# Patient Record
Sex: MALE | Race: WHITE | NOT HISPANIC OR LATINO | Employment: OTHER | URBAN - METROPOLITAN AREA
[De-identification: names, ages, dates, MRNs, and addresses within clinical notes are randomized per-mention and may not be internally consistent; named-entity substitution may affect disease eponyms.]

---

## 2019-01-08 ENCOUNTER — OFFICE VISIT - RIVER FALLS (OUTPATIENT)
Dept: FAMILY MEDICINE | Facility: CLINIC | Age: 64
End: 2019-01-08

## 2019-01-08 ASSESSMENT — MIFFLIN-ST. JEOR: SCORE: 1876.69

## 2019-01-09 LAB
CHOLEST SERPL-MCNC: 279 MG/DL
CHOLEST/HDLC SERPL: 4.4 {RATIO}
GLUCOSE BLD-MCNC: 100 MG/DL (ref 65–99)
HDLC SERPL-MCNC: 63 MG/DL
LDLC SERPL CALC-MCNC: 195 MG/DL
NONHDLC SERPL-MCNC: 216 MG/DL
PSA SERPL-MCNC: 2.7 NG/ML
TRIGL SERPL-MCNC: 95 MG/DL

## 2019-01-14 ENCOUNTER — OFFICE VISIT - RIVER FALLS (OUTPATIENT)
Dept: FAMILY MEDICINE | Facility: CLINIC | Age: 64
End: 2019-01-14

## 2019-01-14 ASSESSMENT — MIFFLIN-ST. JEOR: SCORE: 1882.13

## 2019-04-15 ENCOUNTER — OFFICE VISIT - RIVER FALLS (OUTPATIENT)
Dept: FAMILY MEDICINE | Facility: CLINIC | Age: 64
End: 2019-04-15

## 2019-04-15 ASSESSMENT — MIFFLIN-ST. JEOR: SCORE: 1846.75

## 2020-01-28 ENCOUNTER — OFFICE VISIT - RIVER FALLS (OUTPATIENT)
Dept: FAMILY MEDICINE | Facility: CLINIC | Age: 65
End: 2020-01-28

## 2020-01-28 ENCOUNTER — COMMUNICATION - RIVER FALLS (OUTPATIENT)
Dept: FAMILY MEDICINE | Facility: CLINIC | Age: 65
End: 2020-01-28

## 2020-01-28 ASSESSMENT — MIFFLIN-ST. JEOR: SCORE: 1885.76

## 2020-01-29 ENCOUNTER — COMMUNICATION - RIVER FALLS (OUTPATIENT)
Dept: FAMILY MEDICINE | Facility: CLINIC | Age: 65
End: 2020-01-29

## 2021-01-26 ENCOUNTER — AMBULATORY - RIVER FALLS (OUTPATIENT)
Dept: FAMILY MEDICINE | Facility: CLINIC | Age: 66
End: 2021-01-26

## 2021-01-27 ENCOUNTER — COMMUNICATION - RIVER FALLS (OUTPATIENT)
Dept: FAMILY MEDICINE | Facility: CLINIC | Age: 66
End: 2021-01-27

## 2021-01-27 LAB
BUN SERPL-MCNC: 19 MG/DL (ref 7–25)
BUN/CREAT RATIO - HISTORICAL: NORMAL (ref 6–22)
CALCIUM SERPL-MCNC: 9.2 MG/DL (ref 8.6–10.3)
CHLORIDE BLD-SCNC: 107 MMOL/L (ref 98–110)
CHOLEST SERPL-MCNC: 178 MG/DL
CHOLEST/HDLC SERPL: 2.7 {RATIO}
CO2 SERPL-SCNC: 29 MMOL/L (ref 20–32)
CREAT SERPL-MCNC: 1.11 MG/DL (ref 0.7–1.25)
EGFRCR SERPLBLD CKD-EPI 2021: 69 ML/MIN/1.73M2
GLUCOSE BLD-MCNC: 97 MG/DL (ref 65–99)
HDLC SERPL-MCNC: 67 MG/DL
LDLC SERPL CALC-MCNC: 95 MG/DL
NONHDLC SERPL-MCNC: 111 MG/DL
POTASSIUM BLD-SCNC: 4.5 MMOL/L (ref 3.5–5.3)
SODIUM SERPL-SCNC: 141 MMOL/L (ref 135–146)
TRIGL SERPL-MCNC: 73 MG/DL

## 2021-02-01 ENCOUNTER — TRANSFERRED RECORDS (OUTPATIENT)
Dept: MULTI SPECIALTY CLINIC | Facility: CLINIC | Age: 66
End: 2021-02-01
Payer: MEDICARE

## 2021-02-01 ENCOUNTER — OFFICE VISIT - RIVER FALLS (OUTPATIENT)
Dept: FAMILY MEDICINE | Facility: CLINIC | Age: 66
End: 2021-02-01

## 2021-02-01 ASSESSMENT — MIFFLIN-ST. JEOR: SCORE: 1844.94

## 2021-02-12 ENCOUNTER — COMMUNICATION - RIVER FALLS (OUTPATIENT)
Dept: FAMILY MEDICINE | Facility: CLINIC | Age: 66
End: 2021-02-12

## 2021-02-16 ENCOUNTER — OFFICE VISIT - RIVER FALLS (OUTPATIENT)
Dept: FAMILY MEDICINE | Facility: CLINIC | Age: 66
End: 2021-02-16

## 2021-02-16 ASSESSMENT — MIFFLIN-ST. JEOR: SCORE: 1849.47

## 2021-07-27 ENCOUNTER — AMBULATORY - RIVER FALLS (OUTPATIENT)
Dept: FAMILY MEDICINE | Facility: CLINIC | Age: 66
End: 2021-07-27

## 2021-08-24 ENCOUNTER — AMBULATORY - RIVER FALLS (OUTPATIENT)
Dept: FAMILY MEDICINE | Facility: CLINIC | Age: 66
End: 2021-08-24

## 2021-11-18 ENCOUNTER — OFFICE VISIT - RIVER FALLS (OUTPATIENT)
Dept: FAMILY MEDICINE | Facility: CLINIC | Age: 66
End: 2021-11-18

## 2021-11-18 ASSESSMENT — MIFFLIN-ST. JEOR: SCORE: 1881.23

## 2022-02-12 VITALS
WEIGHT: 233 LBS | HEART RATE: 80 BPM | DIASTOLIC BLOOD PRESSURE: 74 MMHG | BODY MASS INDEX: 30.88 KG/M2 | SYSTOLIC BLOOD PRESSURE: 124 MMHG | TEMPERATURE: 97.5 F | HEIGHT: 73 IN

## 2022-02-12 VITALS
BODY MASS INDEX: 29.74 KG/M2 | DIASTOLIC BLOOD PRESSURE: 80 MMHG | HEART RATE: 84 BPM | HEIGHT: 73 IN | TEMPERATURE: 97.4 F | WEIGHT: 224.4 LBS | SYSTOLIC BLOOD PRESSURE: 124 MMHG

## 2022-02-12 VITALS
HEART RATE: 80 BPM | TEMPERATURE: 97.9 F | DIASTOLIC BLOOD PRESSURE: 78 MMHG | BODY MASS INDEX: 30.75 KG/M2 | SYSTOLIC BLOOD PRESSURE: 158 MMHG | WEIGHT: 232 LBS | HEIGHT: 73 IN | RESPIRATION RATE: 16 BRPM

## 2022-02-12 VITALS
WEIGHT: 225 LBS | BODY MASS INDEX: 29.82 KG/M2 | HEIGHT: 73 IN | DIASTOLIC BLOOD PRESSURE: 64 MMHG | HEART RATE: 62 BPM | SYSTOLIC BLOOD PRESSURE: 126 MMHG

## 2022-02-12 VITALS
TEMPERATURE: 97.6 F | DIASTOLIC BLOOD PRESSURE: 74 MMHG | BODY MASS INDEX: 30.62 KG/M2 | WEIGHT: 231 LBS | HEART RATE: 74 BPM | HEART RATE: 76 BPM | HEIGHT: 73 IN | DIASTOLIC BLOOD PRESSURE: 84 MMHG | BODY MASS INDEX: 30.77 KG/M2 | HEIGHT: 73 IN | SYSTOLIC BLOOD PRESSURE: 136 MMHG | TEMPERATURE: 97.8 F | WEIGHT: 232.2 LBS | SYSTOLIC BLOOD PRESSURE: 130 MMHG

## 2022-02-12 VITALS
WEIGHT: 224 LBS | DIASTOLIC BLOOD PRESSURE: 78 MMHG | SYSTOLIC BLOOD PRESSURE: 132 MMHG | BODY MASS INDEX: 29.69 KG/M2 | HEIGHT: 73 IN | HEART RATE: 82 BPM

## 2022-02-15 ENCOUNTER — AMBULATORY - RIVER FALLS (OUTPATIENT)
Dept: FAMILY MEDICINE | Facility: CLINIC | Age: 67
End: 2022-02-15

## 2022-02-16 NOTE — RESULTS
Patient:   CHASE ZAMORA            MRN: [id number]            FIN: [id number]               Age:   63 years     Sex:  Male     :  1955   Associated Diagnoses:   None   Author:   Krista GONZALEZ, Shalom      Procedure   EKG procedure   Date:  2019.     Indication: pre-operative exam.     EKG findings   Rhythm: heart rate  71  beats/min, sinus normal.     Axis: normal axis, normal configuration.     Intervals: normal.     P waves: normal.     QRS complex: normal.     ST-T-U complex: normal.     Interpretation: normal EKG.

## 2022-02-16 NOTE — NURSING NOTE
Quick Intake Entered On:  11/18/2021 2:29 PM CST    Performed On:  11/18/2021 2:28 PM CST by Mann Queen CMA               Summary   Advance Directive :   No   Height Measured :   73 in(Converted to: 6 ft 1 in, 185.42 cm)    Systolic Blood Pressure :   158 mmHg (HI)    Diastolic Blood Pressure :   78 mmHg   Mean Arterial Pressure :   105 mmHg   BP Site :   Right arm   BP Method :   Manual   Mann Queen CMA - 11/18/2021 2:28 PM CST

## 2022-02-16 NOTE — NURSING NOTE
Comprehensive Intake Entered On:  11/18/2021 2:07 PM CST    Performed On:  11/18/2021 2:01 PM CST by Mann Queen CMA               Summary   Chief Complaint :   Pt here for Right jaw pain and edema x 2 days   Advance Directive :   No   Weight Measured :   232 lb(Converted to: 232 lb 0 oz, 105.233 kg)    Height Measured :   73 in(Converted to: 6 ft 1 in, 185.42 cm)    Body Mass Index :   30.61 kg/m2 (HI)    Body Surface Area :   2.33 m2   Systolic Blood Pressure :   186 mmHg (HI)    Diastolic Blood Pressure :   72 mmHg   Mean Arterial Pressure :   110 mmHg   Peripheral Pulse Rate :   80 bpm   BP Site :   Right arm   Pulse Site :   Radial artery   Temperature Tympanic :   97.9 DegF(Converted to: 36.6 DegC)    Respiratory Rate :   16 br/min   Mann Queen CMA - 11/18/2021 2:01 PM CST   Health Status   Allergies Verified? :   Yes   Medication History Verified? :   Yes   Medical History Verified? :   Yes   Pre-Visit Planning Status :   Not completed   Tobacco Use? :   Current some day smoker   Mann Queen CMA - 11/18/2021 2:01 PM CST   Meds / Allergies   (As Of: 11/18/2021 2:07:40 PM CST)   Allergies (Active)   penicillin  Estimated Onset Date:   Unspecified ; Reactions:   rash ; Created By:   Sade Mendez; Reaction Status:   Active ; Category:   Drug ; Substance:   penicillin ; Type:   Allergy ; Updated By:   Sade Mendez; Reviewed Date:   11/18/2021 2:05 PM CST        Medication List   (As Of: 11/18/2021 2:07:40 PM CST)   Prescription/Discharge Order    Miscellaneous Rx Supply  :   Miscellaneous Rx Supply ; Status:   Prescribed ; Ordered As Mnemonic:   CPAP Supplies ; Simple Display Line:   See Instructions, Heated humidifier x1; Humidifier chamber x1;  Heated tubing x1; Full face mask of choice with headgear  x1; Cushion x 1;\r\nFilters: Disposable x1pk & Reusable x1pk.\r\nLength of Need = 99 Months, 1 EA, 11 Refill(s) ; Ordering Provider:   Joselo Hernandez MD; Catalog Code:   Miscellaneous Rx Supply ;  Order Dt/Tm:   2/16/2021 8:45:37 AM CST          triamcinolone topical  :   triamcinolone topical ; Status:   Prescribed ; Ordered As Mnemonic:   triamcinolone 0.1% topical cream ; Simple Display Line:   1 santa, Topical, bid, 60 gm, 2 Refill(s) ; Ordering Provider:   Shalom Velasco MD; Catalog Code:   triamcinolone topical ; Order Dt/Tm:   2/1/2021 9:03:29 AM CST          atorvastatin  :   atorvastatin ; Status:   Prescribed ; Ordered As Mnemonic:   atorvastatin 20 mg oral tablet ; Simple Display Line:   1 tab(s), Oral, daily, 90 tab(s), 3 Refill(s) ; Ordering Provider:   Shalom Velasco MD; Catalog Code:   atorvastatin ; Order Dt/Tm:   2/1/2021 8:50:21 AM CST            Home Meds    Miscellaneous Rx Supply  :   Miscellaneous Rx Supply ; Status:   Completed ; Ordered As Mnemonic:   Cpap ; Simple Display Line:   0 Refill(s) ; Catalog Code:   Miscellaneous Rx Supply ; Order Dt/Tm:   1/8/2019 8:03:55 AM CST          aspirin  :   aspirin ; Status:   Documented ; Ordered As Mnemonic:   aspirin 81 mg oral tablet ; Simple Display Line:   81 mg, 1 tab(s), PO, daily, tab(s) ; Catalog Code:   aspirin ; Order Dt/Tm:   2/22/2012 2:57:02 PM CST            Social History   Social History   (As Of: 11/18/2021 2:07:40 PM CST)   Alcohol:        Current, 7 TIMES PER WEEK   (Last Updated: 1/9/2019 2:43:06 PM CST by Virgen Anderson)          Tobacco:        Past   Comments:  2/21/2012 9:44 AM - Sade Mendez: occasional cigar   (Last Updated: 12/16/2014 2:50:07 PM CST by Juliette Monterroso MA)   Started at age 21, stopped age 43., Snuff   (Last Updated: 2/1/2021 8:21:21 AM CST by Lala Tuttle LPN)          Electronic Cigarette/Vaping:        Electronic Cigarette Use: Never.   (Last Updated: 2/1/2021 8:21:07 AM CST by Lala Tuttle LPN)          Substance Abuse:        Never   (Last Updated: 12/16/2014 2:50:12 PM CST by Juliette Monterroso MA)          Employment/School:        Employed, Work/School description: 3M  .   (Last Updated: 3/26/2013 7:30:35 AM CDT by Sandra Benavidez)          Home/Environment:        Marital status: .  Spouse/Partner name: Landy.   (Last Updated: 3/26/2013 7:30:44 AM CDT by Sandra Benavidez)          Nutrition/Health:        Type of diet: Regular.   (Last Updated: 1/9/2019 8:23:49 AM CST by Elif Harrison)          Exercise:        Exercise frequency: 3-4 times/week.   (Last Updated: 1/28/2020 9:09:55 AM CST by Juliette Monterroso MA)          Sexual:        Sexually active: Yes.  Identifies as male, Sexual orientation: Straight or heterosexual.  History of STD: No.  Contraceptive Use Details: None.  History of sexual abuse: No.   (Last Updated: 2/2/2021 1:18:01 PM CST by Shaneka Ayon)

## 2022-02-16 NOTE — NURSING NOTE
CAGE Assessment Entered On:  2/1/2021 8:27 AM CST    Performed On:  2/1/2021 8:26 AM CST by Lala Tuttle LPN               Assessment   Have you ever felt you should cut down on your drinking :   No   Have people annoyed you by criticizing your drinking :   No   Have you ever felt bad or guilty about your drinking :   No   Have you ever taken a drink first thing in the morning to steady your nerves or get rid of a hangover (Eye-opener) :   No   CAGE Score :   0    Lala Tuttle LPN - 2/1/2021 8:26 AM CST

## 2022-02-16 NOTE — TELEPHONE ENCOUNTER
---------------------  From: Shalom Velasco MD   To: CHASE ZAMORA    Sent: 1/29/2020 1:15:46 PM CST  Subject: Patient Message - Results Notification     Your tests look good.       Results:  Date Result Name Ind Value Ref Range   1/28/2020 10:18 AM Glucose Level ((H)) 103 mg/dL (65 - 99)   1/28/2020 10:18 AM Hgb A1c  5.4 ( - <5.7)   1/28/2020 10:18 AM Cholesterol  190 mg/dL ( - <200)   1/28/2020 10:18 AM Non-HDL Cholesterol  123 ( - <130)   1/28/2020 10:18 AM HDL  67 mg/dL (>40 - )   1/28/2020 10:18 AM Cholesterol/HDL Ratio  2.8 ( - <5.0)   1/28/2020 10:18 AM LDL ((H)) 104    1/28/2020 10:18 AM Triglyceride  94 mg/dL ( - <150)

## 2022-02-16 NOTE — NURSING NOTE
Depression Screening Entered On:  1/9/2019 2:42 PM CST    Performed On:  1/8/2019 2:42 PM CST by Virgen Anderson               Depression Screening   Feeling Down, Depressed, Hopeless :   Not at all   Little Interest - Pleasure in Activities :   Not at all   Initial Depression Screen Score :   0    Trouble Falling or Staying Asleep :   Not at all   Feeling Tired or Little Energy :   Not at all   Poor Appetite or Overeating :   Not at all   Feeling Bad About Yourself :   Not at all   Trouble Concentrating :   Not at all   Moving or Speaking Slowly :   Not at all   Thoughts Better Off Dead or Hurting Self :   Not at all   Detailed Depression Screen Score :   0    Total Depression Screen Score :   0    SONIA Difficulty with Work, Home, Others :   Not difficult at all   Virgen Anderson - 1/9/2019 2:42 PM CST

## 2022-02-16 NOTE — TELEPHONE ENCOUNTER
---------------------  From: Shalom Velasco MD   To: CHASE ZAMORA    Sent: 1/9/2019 2:59:01 PM CST  Subject: Patient Message - Results Notification      Your LDL or bad cholesterol is elevated and I recommend starting a cholesterol lowering medication to reduce your risk of heart disease.  Please let me know if you would like to start this and your desired pharmacy.    Results:  Date Result Name Ind Value Ref Range   1/8/2019 9:04 AM Glucose Level ((H)) 100 mg/dL (65 - 99)   1/8/2019 9:04 AM Cholesterol ((H)) 279 mg/dL ( - <200)   1/8/2019 9:04 AM Non-HDL Cholesterol ((H)) 216 ( - <130)   1/8/2019 9:04 AM HDL  63 mg/dL (>40 - )   1/8/2019 9:04 AM Cholesterol/HDL Ratio  4.4 ( - <5.0)   1/8/2019 9:04 AM LDL ((H)) 195    1/8/2019 9:04 AM Triglyceride  95 mg/dL ( - <150)   1/8/2019 9:04 AM PSA  2.7 ng/mL ( - < OR = 4.0)

## 2022-02-16 NOTE — PROGRESS NOTES
Chief Complaint    [facility]medicare  History of Present Illness      General health status:  good      Diet:  balanced      Exercise:  occasional      Medical encounters:  none      Dental exam:  due next week      Eye exam:  due      Caffeine use:  daily      Tobacco use:  no      Alcohol use:  daily      Lipid and diabetes screening:  up to date      Colon cancer screening:  due 2022      Prostate cancer screening:  up to date      Other concerns:  none      Chronic disease:  hyperlipidemia and HTN under control         Review of Systems          Constitutional:  No fever, No chills, No sweats, No weakness, No fatigue.            Eye:  No recent visual problem.            Ear/Nose/Mouth/Throat:  No decreased hearing, No nasal congestion, No sore throat.            Respiratory:  No shortness of breath, No cough.            Cardiovascular:  Negative, No chest pain, No palpitations, No peripheral edema.            Gastrointestinal:  No nausea, No vomiting, No diarrhea, No constipation, No heartburn.            Genitourinary:  No dysuria, No change in urine stream.            Hematology/Lymphatics:  No bruising tendency, No bleeding tendency.            Endocrine:  No cold intolerance, No heat intolerance.            Immunologic:  Negative.            Musculoskeletal:  No back pain, No neck pain, No joint pain, No muscle pain.            Integumentary: rash, No dryness, No skin lesion.            Neurologic:  Alert and oriented X4, No headache.                Psychiatric:  No anxiety, No depression  Physical Exam   Vitals & Measurements    HR: 82 (Peripheral)  BP: 132/78     HT: 73 in  WT: 224 lb  BMI: 29.55           General:  Alert and oriented, No acute distress.            Eye:  Pupils are equal, round and reactive to light, Extraocular movements are intact, Normal conjunctiva.            HENT:  Normocephalic, Tympanic membranes are clear, Oral mucosa is moist, No pharyngeal erythema, No sinus tenderness.             Neck:  Supple, Non-tender, No carotid bruit, No lymphadenopathy, No thyromegaly.            Respiratory:  Lungs are clear to auscultation, Respirations are non-labored, Breath sounds are equal, No chest wall tenderness.            Cardiovascular:  Normal rate, Regular rhythm, No murmur, No gallop, Good pulses equal in all extremities, Normal peripheral perfusion, No edema.            Gastrointestinal:  Soft, Non-tender, Non-distended, Normal bowel sounds, No organomegaly.            Genitourinary:  No CVA tenderness          Lymphatics:  WNL.            Musculoskeletal:  Normal range of motion, Normal strength, No tenderness, No swelling, No deformity.            Integumentary:  Warm, Dry, erythematous, scaling, lesions on trunk          Neurologic:  Alert, Oriented, Normal sensory, Normal motor function, No focal deficits.            Psychiatric:  Cooperative, Appropriate mood & affect.   Assessment/Plan       1. Medicare annual wellness visit, subsequent (Z00.00)         Discussed diet, weight management, BMI, activity and exercise        Follow up in one year        Activity recommendations:  150-300 minutes of moderate physical activity per week; moderate or greater intensity muscle strengthening activity 2 or more days a week        Diet recommendations:  Eating plan to promote a caloric deficit of 500-750 kcal per day.  Mediterranean or DASH diet offer well balanced food choices.        Risk factors for development of chronic disease and infirmities of ageing have been discussed and plans for minimizing and screening for these conditions have been discussed.  Plans in place for management of conditions identified.  The preventive services checklist has been reviewed with the patient and a copy has been placed in the electronic record.  Prostate cancer, colon cancer, diabetes, lipid, HTN, obesity screening discussed and initiated                2. Hypertension goal BP (blood pressure) < 130/80 (I10)          controlled with TLC                3. Hyperlipidemia (E78.2)         controlled, tolerates statin                4. DERECK (obstructive sleep apnea) (G47.33)         compliant with CPAP                5. 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)         lesions on trunk are psoriatic in nature         Ordered:          triamcinolone topical, 1 santa, Topical, bid, # 60 gm, 2 Refill(s), Type: Maintenance, Pharmacy: [facility], 1 santa Topical bid, 73, in, 02/01/21 8:19:00 CST, Height Measured, 224, lb, 02/01/21 8:19:00 CST, Weight Measured, (Ordered)                Orders:         atorvastatin, = 1 tab(s), Oral, daily, # 30 tab(s), 0 Refill(s), Type: Hard Stop, Pharmacy: [facility], Pt is due for annual visit and labs prior to further refills., 73, in, 01/28/20 9:00:00 CST, Height Measured, 233, lb, 01/28/20 9:00:00 CST, Weight Ayla..., (Completed)         atorvastatin, = 1 tab(s), Oral, daily, # 90 tab(s), 3 Refill(s), Type: Maintenance, Pharmacy: [facility], 1 tab(s) Oral daily, 73, in, 02/01/21 8:19:00 CST, Height Measured, 224, lb, 02/01/21 8:19:00 CST, Weight Measured, (Ordered)         pneumococcal 23-polyvalent vaccine, 0.5 mL, IM, once, (Completed)         97737 imadm prq id subq/im njxs 1 vaccine (Charge), Quantity: 1, Encounter for vaccination         55189 pneumococcal polysac vaccine 23-v 2 />yr subq/im (Charge), Quantity: 1, Encounter for vaccination         Return to Clinic (Request), RFV: Yearly exam/AWV, Return in 1 year         Return to Clinic (Request), RFV: AWV, fasting labs, Return in 1 year  Patient Information     Name:CHASE ZAMORA CATRACHITO      Address:      [address] [place] [address] [address] [place] [place], [place] [id number]     Sex:Male     YOB: 1955     [phone]     Emergency Contact:LUCAS ZAMORA     MRN:[id number]     FIN:[id number]     Location:[facility]     Date of Service:02/01/2021      Primary Care Physician:       Krista GONZALEZ, Shalom, [phone]       Attending Physician:       Shalom Velasco MD, [phone]  Problem List/Past Medical History    Ongoing     Hypertension goal BP (blood pressure) < 130/80     Obesity     DERECK (obstructive sleep apnea)     Tobacco use    Historical     Cyst, arachnoid  Procedure/Surgical History     Bilateral inguinal hernia repair (01/22/2019)           Polysomnogram - Split Night Study (01/27/2015)            Comments: AHI 22.1/hr and heany snoring 80% of time.  CPAP titrated to +8cm H20 which decreased AHI to 0.9/hr and eliminated snoring..     Colonoscopy (03/19/2012)            Comments: Normal colonoscopy repeat in 10 years.     Bilateral vasectomy (02/23/1995)        Medications    aspirin 81 mg oral tablet, 81 mg= 1 tab(s), Oral, daily    atorvastatin 20 mg oral tablet, 1 tab(s), Oral, daily, 3 refills    Cpap    triamcinolone 0.1% topical cream, 1 santa, Topical, bid, 2 refills  Allergies    penicillin (rash)  Social History    Smoking Status     Former smoker     Alcohol      Current, 7 TIMES PER WEEK     Electronic Cigarette/Vaping      Electronic Cigarette Use: Never.     Employment/School      Employed, Work/School description: 3M .     Exercise      Exercise frequency: 3-4 times/week.     Home/Environment      Marital status: . Spouse/Partner name: Landy.     Nutrition/Health      Type of diet: Regular.     Sexual      Sexually active: No. Identifies as male, Sexual orientation: Straight or heterosexual.     Substance Abuse      Never     Tobacco      Started at age 21, stopped age 43., Snuff  Family History    Alzheimer's disease: Mother.    HTN - Hypertension: Father.    Rheumatoid arthritis: Father.  Immunizations      Vaccine Date Status          pneumococcal (PPSV23) 02/01/2021 Given          zoster vaccine, inactivated 10/21/2020 Recorded          pneumococcal (PCV13) 01/28/2020 Given          tetanus/diphth/pertuss (Tdap) adult/adol 03/25/2013 Given          Td 05/03/2004 Recorded  Lab Results        Lab Results (Last 4 results within 90 days)        Sodium Level: 141 mmol/L [135 mmol/L - 146 mmol/L] (01/26/21 07:59:00)       Potassium Level: 4.5 mmol/L [3.5 mmol/L - 5.3 mmol/L] (01/26/21 07:59:00)       Chloride Level: 107 mmol/L [98 mmol/L - 110 mmol/L] (01/26/21 07:59:00)       CO2 Level: 29 mmol/L [20 mmol/L - 32 mmol/L] (01/26/21 07:59:00)       Glucose Level: 97 mg/dL [65 mg/dL - 99 mg/dL] (01/26/21 07:59:00)       BUN: 19 mg/dL [7 mg/dL - 25 mg/dL] (01/26/21 07:59:00)       Creatinine Level: 1.11 mg/dL [0.7 mg/dL - 1.25 mg/dL] (01/26/21 07:59:00)       BUN/Creat Ratio: NOT APPLICABLE [6  - 22] (01/26/21 07:59:00)       eGFR: 69 mL/min/1.73m2 (01/26/21 07:59:00)       eGFR : 80 mL/min/1.73m2 (01/26/21 07:59:00)       Calcium Level: 9.2 mg/dL [8.6 mg/dL - 10.3 mg/dL] (01/26/21 07:59:00)       Cholesterol: 178 mg/dL (01/26/21 07:59:00)       Non-HDL Cholesterol: 111 (01/26/21 07:59:00)       HDL: 67 mg/dL (01/26/21 07:59:00)       Cholesterol/HDL Ratio: 2.7 (01/26/21 07:59:00)       LDL: 95 (01/26/21 07:59:00)       Triglyceride: 73 mg/dL (01/26/21 07:59:00)

## 2022-02-16 NOTE — NURSING NOTE
Comprehensive Intake Entered On:  1/8/2019 8:05 AM CST    Performed On:  1/8/2019 8:00 AM CST by Lala Tuttle LPN               Summary   Chief Complaint :   Annual Physical    Weight Measured :   231 lb(Converted to: 231 lb 0 oz, 104.78 kg)    Height Measured :   73 in(Converted to: 6 ft 1 in, 185.42 cm)    Body Mass Index :   30.47 kg/m2 (HI)    Body Surface Area :   2.32 m2   Systolic Blood Pressure :   146 mmHg (HI)    Diastolic Blood Pressure :   88 mmHg (HI)    Mean Arterial Pressure :   107 mmHg   Peripheral Pulse Rate :   74 bpm   BP Site :   Right arm   Pulse Site :   Radial artery   BP Method :   Manual   HR Method :   Manual   Temperature Tympanic :   97.6 DegF(Converted to: 36.4 DegC)  (LOW)    Lala Tuttle LPN - 1/8/2019 8:00 AM CST   Health Status   Allergies Verified? :   Yes   Medication History Verified? :   Yes   Pre-Visit Planning Status :   Completed   Lala Tuttle LPN - 1/8/2019 8:00 AM CST   Consents   Consent for Immunization Exchange :   Consent Granted   Consent for Immunizations to Providers :   Consent Granted   Lala Tuttle LPN - 1/8/2019 8:00 AM CST   Meds / Allergies   (As Of: 1/8/2019 8:05:29 AM CST)   Allergies (Active)   penicillin  Estimated Onset Date:   Unspecified ; Reactions:   rash ; Created By:   Sade Mendez; Reaction Status:   Active ; Category:   Drug ; Substance:   penicillin ; Type:   Allergy ; Updated By:   Sade Mendez; Reviewed Date:   11/11/2015 5:32 PM CST        Medication List   (As Of: 1/8/2019 8:05:29 AM CST)   Home Meds    Miscellaneous Rx Supply  :   Miscellaneous Rx Supply ; Status:   Documented ; Ordered As Mnemonic:   Cpap ; Simple Display Line:   0 Refill(s) ; Catalog Code:   Miscellaneous Rx Supply ; Order Dt/Tm:   1/8/2019 8:03:55 AM          aspirin  :   aspirin ; Status:   Documented ; Ordered As Mnemonic:   aspirin 81 mg oral tablet ; Simple Display Line:   81 mg, 1 tab(s), PO, daily, tab(s) ; Catalog Code:   aspirin ; Order  Dt/Tm:   2/22/2012 2:57:02 PM

## 2022-02-16 NOTE — NURSING NOTE
CAGE Assessment Entered On:  1/9/2019 2:42 PM CST    Performed On:  1/8/2019 2:42 PM CST by Virgen Anderson               Assessment   Have you ever felt you should cut down on your drinking :   Yes   Have people annoyed you by criticizing your drinking :   No   Have you ever felt bad or guilty about your drinking :   No   Have you ever taken a drink first thing in the morning to steady your nerves or get rid of a hangover (Eye-opener) :   No   CAGE Score :   1    Virgen Anedrson - 1/9/2019 2:42 PM CST

## 2022-02-16 NOTE — NURSING NOTE
Comprehensive Intake Entered On:  1/14/2019 7:59 AM CST    Performed On:  1/14/2019 7:54 AM CST by Loc PERSON, Juliette               Summary   Chief Complaint :   preop--surgery 1/22/18 at [facility] w/ Dr. Yu for bilateral IH repair.   Weight Measured :   232.2 lb(Converted to: 232 lb 3 oz, 105.32 kg)    Height Measured :   73 in(Converted to: 6 ft 1 in, 185.42 cm)    Body Mass Index :   30.63 kg/m2 (HI)    Body Surface Area :   2.33 m2   Systolic Blood Pressure :   130 mmHg   Diastolic Blood Pressure :   74 mmHg   Mean Arterial Pressure :   93 mmHg   Peripheral Pulse Rate :   76 bpm   BP Site :   Right arm   Pulse Site :   Radial artery   BP Method :   Manual   HR Method :   Manual   Temperature Tympanic :   97.8 DegF(Converted to: 36.6 DegC)  (LOW)    Juliette Monterroso MA - 1/14/2019 7:54 AM CST   Health Status   Allergies Verified? :   Yes   Medication History Verified? :   Yes   Medical History Verified? :   Yes   Pre-Visit Planning Status :   Completed   Tobacco Use? :   Former smoker   Juliette Monterroso MA - 1/14/2019 7:54 AM CST   Consents   Consent for Immunization Exchange :   Consent Granted   Consent for Immunizations to Providers :   Consent Granted   Juliette Monterroso MA - 1/14/2019 7:54 AM CST   Meds / Allergies   (As Of: 1/14/2019 8:00:00 AM CST)   Allergies (Active)   penicillin  Estimated Onset Date:   Unspecified ; Reactions:   rash ; Created By:   Sade Mendez; Reaction Status:   Active ; Category:   Drug ; Substance:   penicillin ; Type:   Allergy ; Updated By:   Sade Mendez; Reviewed Date:   11/11/2015 5:32 PM CST        Medication List   (As Of: 1/14/2019 8:00:00 AM CST)   Home Meds    aspirin  :   aspirin ; Status:   Documented ; Ordered As Mnemonic:   aspirin 81 mg oral tablet ; Simple Display Line:   81 mg, 1 tab(s), PO, daily, tab(s) ; Catalog Code:   aspirin ; Order Dt/Tm:   2/22/2012 2:57:02 PM          Miscellaneous Rx Supply  :   Miscellaneous Rx Supply ; Status:   Documented ;  Ordered As Mnemonic:   Cpap ; Simple Display Line:   0 Refill(s) ; Catalog Code:   Miscellaneous Rx Supply ; Order Dt/Tm:   1/8/2019 8:03:55 AM            Social History   Social History   (As Of: 1/14/2019 8:00:00 AM CST)   Alcohol:        Current, 7 TIMES PER WEEK   (Last Updated: 1/9/2019 2:43:06 PM CST by Virgen Anderson)          Tobacco:        Past   Comments:  2/21/2012 9:44 AM - Sade Mendez: occasional cigar   (Last Updated: 12/16/2014 2:50:07 PM CST by Juliette Monterroso MA)          Substance Abuse:        Never   (Last Updated: 12/16/2014 2:50:12 PM CST by Juliette Monterroso MA)          Employment and Education:        Employed, Work/School description: 3M .   (Last Updated: 3/26/2013 7:30:35 AM CDT by Sandra Benavidez)          Home and Environment:        Marital status: .  Spouse/Partner name: Landy.   (Last Updated: 3/26/2013 7:30:44 AM CDT by Sandra Benavidez)          Nutrition and Health:        Type of diet: Regular.   (Last Updated: 1/9/2019 8:23:49 AM CST by Elif Harrison)          Exercise and Physical Activity:        Exercise frequency: 2-3 times per week.   (Last Updated: 1/9/2019 8:24:00 AM CST by Elif Harrison)          Sexual:        Sexually active: Yes.  Identifies as male, Sexual orientation: Straight or heterosexual.   (Last Updated: 1/9/2019 8:24:08 AM CST by Elif Harrison)

## 2022-02-16 NOTE — PROGRESS NOTES
Patient:   CHASE ZAMORA            MRN: [id number]            FIN: [id number]               Age:   63 years     Sex:  Male     :  1955   Associated Diagnoses:   Preoperative examination; Bilateral inguinal hernia   Author:   Shalom Velasco MD      Preoperative Information   Indication for surgery:  Bilateral inguinal hernias.         Associated symptoms: discomfort with activity.    Accompanied by:  No one.    Source of history:  Self, Medical record.        History limitation:  None.       Chief Complaint   2019 7:54 AM CST    preop--surgery 17 at [facility] w/ Dr. Yu for bilateral IH repair.      Review of Systems   Constitutional:  No fever, No chills, No sweats, No weakness, No fatigue.    Eye:  No recent visual problem.    Ear/Nose/Mouth/Throat:  No decreased hearing, No nasal congestion, No sore throat.    Respiratory:  No shortness of breath, No cough.    Cardiovascular:  Negative, No chest pain, No palpitations, No peripheral edema.    Gastrointestinal:  No nausea, No vomiting, No diarrhea, No constipation, No heartburn.    Genitourinary:  No dysuria, No change in urine stream.    Hematology/Lymphatics:  No bruising tendency, No bleeding tendency.    Endocrine:  No cold intolerance, No heat intolerance.    Immunologic:  Negative.    Musculoskeletal:  No back pain, No neck pain, No joint pain, No muscle pain.    Integumentary:  No rash, No dryness, No skin lesion.    Neurologic:  Alert and oriented X4, No headache.    Psychiatric:  No anxiety, No depression.       Health Status   Allergies:    Allergic Reactions (Selected)  Severity Not Documented  Penicillin (Rash)   Medications:  (Selected)   Prescriptions  Prescribed  atorvastatin 20 mg oral tablet: = 1 tab(s) ( 20 mg ), PO, Daily, # 90 tab(s), 1 Refill(s), Type: Maintenance, Pharmacy: [id number], 1 tab(s) Oral daily  Documented Medications  Documented  Cpap: Cpap, Supply, 0 Refill(s), Type: Maintenance  aspirin  81 mg oral tablet: 1 tab(s) ( 81 mg ), PO, daily, tab(s), 0 Refill(s), Type: Maintenance   Problem list:    All Problems  DERECK (obstructive sleep apnea) / SNOMED CT 165305429 / Confirmed  Obesity / SNOMED CT 1070203320 / Probable  Tobacco user / ICD-9-.1 / Probable  Resolved: Cyst, arachnoid / SNOMED CT 22423407      Histories   Past Medical History:    Active  DERECK (obstructive sleep apnea) (749510351): Onset on 1/27/2015 at 59 years.  Obesity (8025754508)  Resolved  Cyst, arachnoid (22483796): Onset in the month of 6/2000 at 44 years  Resolved.   Family History:    Rheumatoid arthritis  Father     Procedure history:    Polysomnogram - Split Night Study on 1/27/2015 at 59 Years.  Comments:  2/11/2015 1:20 PM CST - Rika Serna CMA  AHI 22.1/hr and heany snoring 80% of time.  CPAP titrated to +8cm H20 which decreased AHI to 0.9/hr and eliminated snoring.  Colonoscopy (SNOMED CT 477801975) on 3/19/2012 at 56 Years.  Comments:  8/9/2012 9:17 AM CDT - Patricia Marcano MA  Normal colonoscopy repeat in 10 years  Bilateral vasectomy (SNOMED CT 945520297) on 2/23/1995 at 39 Years.   Social History:        Alcohol Assessment            Current, 7 TIMES PER WEEK      Tobacco Assessment            Past                     Comments:                      02/21/2012 - Sade Mendez                     occasional cigar      Substance Abuse Assessment            Never      Employment and Education Assessment            Employed, Work/School description: 3M .      Home and Environment Assessment            Marital status: .  Spouse/Partner name: Landy.      Nutrition and Health Assessment            Type of diet: Regular.      Exercise and Physical Activity Assessment            Exercise frequency: 2-3 times per week.      Sexual Assessment            Sexually active: Yes.  Identifies as male, Sexual orientation: Straight or heterosexual.     Has no history of anemia.  Has no history of DVT or pulmonary  embolism.  Has no personal history of bleeding problems.   Has no personal history of anesthesia reactions.  Patient does not have active tuberculosis.  Patient does have obstructive sleep apnea    S/he has not taken aspirin or aspirin containing products in the last week.     S/he has not taken Plavix (Clopidogrel) in the last 2 weeks.    S/he has not taken warfarin in the past week.    S/he has not been on corticosteroids for more than 2 weeks recently.      S/he is not DNR before, during or after surgery.     Chest pain / SOB walking up 2 flights of steps:  no  Pain in neck or jaw:  no  CAD MI:  no  Afib:  no  Heart Failure:  no  Asthma  or Bronchitis:  no  Diabetes:  no  Seizure Disorder:  no  CKD:  no  Thyroid Disease:  no  Liver Disease:  no  CVA:  no         Physical Examination   Vital Signs   1/14/2019 7:54 AM CST Temperature Tympanic 97.8 DegF  LOW    Peripheral Pulse Rate 76 bpm    Pulse Site Radial artery    HR Method Manual    Systolic Blood Pressure 130 mmHg    Diastolic Blood Pressure 74 mmHg    Mean Arterial Pressure 93 mmHg    BP Site Right arm    BP Method Manual      Measurements from flowsheet : Measurements   1/14/2019 7:54 AM CST Height Measured - Standard 73 in    Weight Measured - Standard 232.2 lb    BSA 2.33 m2    Body Mass Index 30.63 kg/m2  HI      General:  Alert and oriented, No acute distress.    Eye:  Pupils are equal, round and reactive to light, Extraocular movements are intact, Normal conjunctiva.    HENT:  Normocephalic, Tympanic membranes are clear, Oral mucosa is moist, No pharyngeal erythema, No sinus tenderness.    Neck:  Supple, Non-tender, No carotid bruit, No lymphadenopathy, No thyromegaly.    Respiratory:  Lungs are clear to auscultation, Respirations are non-labored, Breath sounds are equal, No chest wall tenderness.    Cardiovascular:  Normal rate, Regular rhythm, No murmur, No gallop, Good pulses equal in all extremities, Normal peripheral perfusion, No edema.     Gastrointestinal:  Soft, Non-tender, Non-distended, Normal bowel sounds, No organomegaly.    Genitourinary:  No costovertebral angle tenderness.    Lymphatics:  WNL.    Musculoskeletal:  Normal range of motion, Normal strength, No tenderness, No swelling, No deformity.    Integumentary:  Warm, Dry, No rash.    Neurologic:  Alert, Oriented, Normal sensory, Normal motor function, No focal deficits.    Psychiatric:  Cooperative, Appropriate mood & affect.       Review / Management   ECG interpretation:  Date:  1/14/2019.     Indication: pre-operative exam.     EKG findings   Rhythm: heart rate  71  beats/min, sinus normal.     Axis: normal axis, normal configuration.     Intervals: normal.     P waves: normal.     QRS complex: normal.     ST-T-U complex: normal.     Interpretation: normal EKG.  .       Impression and Plan   Diagnosis     Preoperative examination (JTR38-SK Z01.818).     Bilateral inguinal hernia (ORQ56-ZI K40.20).     Condition:  Stable, very low risk for cardiac or pulmonary complications.

## 2022-02-16 NOTE — NURSING NOTE
Medicare Visit Entered On:  2020 9:10 AM CST    Performed On:  2020 9:00 AM CST by Juliette Monterroso MA               Summary   Chief Complaint :   AWV   Weight Measured :   233 lb(Converted to: 233 lb 0 oz, 105.69 kg)    Height Measured :   73 in(Converted to: 6 ft 1 in, 185.42 cm)    Body Mass Index :   30.74 kg/m2 (HI)    Body Surface Area :   2.33 m2   Systolic Blood Pressure :   124 mmHg   Diastolic Blood Pressure :   74 mmHg   Mean Arterial Pressure :   91 mmHg   Peripheral Pulse Rate :   80 bpm   BP Site :   Right arm   Pulse Site :   Radial artery   BP Method :   Manual   HR Method :   Manual   Temperature Tympanic :   97.5 DegF(Converted to: 36.4 DegC)  (LOW)    Loc PERSON, Juliette - 2020 9:00 AM CST   Health Status   Allergies Verified? :   Yes   Medication History Verified? :   Yes   Medical History Verified? :   Yes   Pre-Visit Planning Status :   Completed   Tobacco Use? :   Former smoker   Juliette Monterroso MA - 2020 9:00 AM CST   Demographics   Last Name :   [facility]   Address :   [address] [place] [address]   First Name :   CHASE   Middle Initial :   W   Responsible Party Date of Birth () :   1955 CST   City :   [place]   State :   [place]   Zip Code :   [address]   Loc PERSON, Juliette - 2020 9:00 AM CST   Providers Grid   Provider Name :    [facility]           Provider Specialty :    Optometrist   Dentist             Loc PERSON, Juliette - 2020 9:00 AM CST  Juliette Monterroso MA - 2020 9:00 AM CST        Ancillary Services Grid   Name :    [facility]              Type of Service :    Pharmacy                Juliette Monterroso MA - 2020 9:00 AM CST         Consents   Consent for Immunization Exchange :   Consent Granted   Consent for Immunizations to Providers :   Consent Granted   Juliette Monterroso MA - 2020 9:00 AM CST   Meds / Allergies   (As Of: 2020 9:10:14 AM CST)   Allergies (Active)   penicillin  Estimated Onset Date:   Unspecified ;  Reactions:   rash ; Created By:   Sade Mendez; Reaction Status:   Active ; Category:   Drug ; Substance:   penicillin ; Type:   Allergy ; Updated By:   Sade Mendez; Reviewed Date:   11/11/2015 5:32 PM CST        Medication List   (As Of: 1/28/2020 9:10:14 AM CST)   Prescription/Discharge Order    atorvastatin  :   atorvastatin ; Status:   Prescribed ; Ordered As Mnemonic:   atorvastatin 20 mg oral tablet ; Simple Display Line:   1 tab(s), Oral, daily, 90 tab(s), 0 Refill(s) ; Ordering Provider:   Shalom Velasco MD; Catalog Code:   atorvastatin ; Order Dt/Tm:   10/28/2019 2:01:53 PM CDT            Home Meds    aspirin  :   aspirin ; Status:   Documented ; Ordered As Mnemonic:   aspirin 81 mg oral tablet ; Simple Display Line:   81 mg, 1 tab(s), PO, daily, tab(s) ; Catalog Code:   aspirin ; Order Dt/Tm:   2/22/2012 2:57:02 PM CST          Miscellaneous Rx Supply  :   Miscellaneous Rx Supply ; Status:   Documented ; Ordered As Mnemonic:   Cpap ; Simple Display Line:   0 Refill(s) ; Catalog Code:   Miscellaneous Rx Supply ; Order Dt/Tm:   1/8/2019 8:03:55 AM CST            Family History   Family History   (As Of: 1/28/2020 9:10:14 AM CST)   Mother:   Relation:   Mother ; Gender:   Female ;  YOB: 1933 12:00:00 AM CST            Nomenclature:   Alzheimer's disease ; Value:   Positive          Father:   Relation:   Father ; Gender:   Male ;  YOB: 1928 12:00:00 AM CST            Nomenclature:   Rheumatoid arthritis ; Value:   Positive            Nomenclature:   HTN - Hypertension ; Value:   Positive            Social History   Social History   (As Of: 1/28/2020 9:10:14 AM CST)   Alcohol:        Current, 7 TIMES PER WEEK   (Last Updated: 1/9/2019 2:43:06 PM CST by Virgen Anderson)          Tobacco:        Past   Comments:  2/21/2012 9:44 AM - Sade Mendez: occasional cigar   (Last Updated: 12/16/2014 2:50:07 PM CST by Juliette Monterroso MA)   Started at age 21, stopped age 43., Snuff    (Last Updated: 8/12/2019 1:33:07 PM CDT by Shaneka Ayon)          Substance Abuse:        Never   (Last Updated: 12/16/2014 2:50:12 PM CST by Juliette Monterroso MA)          Employment/School:        Employed, Work/School description: 3M .   (Last Updated: 3/26/2013 7:30:35 AM CDT by Sandra Benavidez)          Home/Environment:        Marital status: .  Spouse/Partner name: Landy.   (Last Updated: 3/26/2013 7:30:44 AM CDT by Sandra Benavidez)          Nutrition/Health:        Type of diet: Regular.   (Last Updated: 1/9/2019 8:23:49 AM CST by Elif Harrison)          Exercise:        Exercise frequency: 3-4 times/week.   (Last Updated: 1/28/2020 9:09:55 AM CST by Juliette Monterroso MA)          Sexual:        Sexually active: No.  Identifies as male, Sexual orientation: Straight or heterosexual.   (Last Updated: 1/28/2020 9:10:10 AM CST by Juliette Monterroso MA)            Geriatric Depression Screening   Geriatric Depression Satisfied Life :   Yes   Geriatric Depression Dropped Activities :   No   Geriatric Depression Life Empty :   No   Geriatric Depression Bored :   No   Geriatric Depression Good Spirits :   Yes   Geriatric Depression Afraid Bad Things :   No   Geriatric Depression Feel Happy :   Yes   Geriatric Depression Feel Helpless :   No   Geriatric Depression Prefer to Stay Home :   No   Geriatric Depression Memory Problems :   No   Geriatric Depression Wonderful Be Alive :   Yes   Geriatric Depression Feel Worthless :   No   Geriatric Depression Situation Hopeless :   No   Geriatric Depression Others Better Off :   No   Geriatric Depression Full of Energy :   Yes   Geriatric Depression Total Score :   0    Juliette Monterroso MA - 1/28/2020 9:00 AM CST   Hearing and Vision Screening   Audiogram Result Right Ear :   Fail   Audiogram Result Left Ear :   Pass   Hearing Screen Comments :   failed right ear at 4000Hz   Juliette Monterroso MA - 1/28/2020 9:00 AM CST   Home Safety Screen   Emergency Numbers Kept/Updated :    Yes   Aware of Smoking Dangers :   Yes   Smoke Alarms/Fire Extinguisher Available :   Yes   Household Members Fire Safety Knowledge :   Yes   Firearms Unloaded and Secure :   Yes   Floor Rugs Removed or Fastened :   No   Mats in Bathtub/Shower :   No   Stairway Rails or Banisters :   Yes   Outdoor Clutter Safety :   Yes   Indoor Clutter Safety :   Yes   Electrical Cord Safety :   Yes   Juliette Monterroso MA 1/28/2020 9:00 AM CST   Sr Fall Risk   History of Fall in Last 3 Months Sr :   Yes   Juliette Monterroso MA 1/28/2020 9:00 AM CST   Functional Assessment   Focused Functional Assessment Grid   Bathing :   Independent (2)   Dressing :   Independent (2)   Toileting :   Independent (2)   Transferring Bed or Chair :   Independent (2)   Feeding :   Independent (2)   Juliette Monterroso MA 1/28/2020 9:00 AM CST   Capable of Shopping :   Yes   Capable of Walking :   Yes   Capable of Housekeeping :   Yes   Capable of Managing Medications :   Yes   Capable of Handling Finances :   Yes   Juliette Monterroso MA 1/28/2020 9:00 AM CST

## 2022-02-16 NOTE — NURSING NOTE
Vital Signs Entered On:  1/8/2019 11:15 AM CST    Performed On:  1/8/2019 11:15 AM CST by Lala Tuttle LPN               Vital Signs   Systolic Blood Pressure Repeat :   136 mmHg   Diastolic Blood Pressure Repeat :   84 mmHg   Vital Signs Comments :   Goal is 130/80   BP Site Repeat :   Right arm   Lala Tuttle LPN - 1/8/2019 11:15 AM CST

## 2022-02-16 NOTE — TELEPHONE ENCOUNTER
---------------------  From: Kaylan Louis CMA   To: Sleep Message Pool ([id number]);     Sent: 2/16/2021 8:48:34 AM CST  Subject: General Message     Pt was in today to follow up on his DERECK and is needing new supplies. Has been using [facility] for his DME but is ok with switch because he's had some issues getting a hold of them.    Thank you,  Cindi with patient he is interested in going through [facility]. Patient's information has been faxed to [facility].

## 2022-02-16 NOTE — TELEPHONE ENCOUNTER
Entered by Yoselin Bearden on July 24, 2019 12:16:35 PM CDT  ---------------------  From: Yoselin Bearden   To: [facility] #[id number]    Sent: 7/24/2019 12:16:35 PM CDT  Subject: Medication Management     ** Submitted: **  Order:atorvastatin (atorvastatin 20 mg oral tablet)  1 tab(s)  Oral  daily  Qty:  90 tab(s)        Days Supply:  90        Refills:  0          Substitutions Allowed     Route To [facility] #[id number]    Signed by Yoselin Bearden  7/24/2019 12:16:00 PM    ** Submitted: **  Complete:atorvastatin (atorvastatin 20 mg oral tablet)   Signed by Yoselin Bearden  7/24/2019 12:16:00 PM    ** Not Approved:  **  atorvastatin (ATORVASTATIN 20MG TABLETS)  TAKE 1 TABLET BY MOUTH DAILY  Qty:  90 tab(s)        Days Supply:  90        Refills:  0          Substitutions Allowed     Route To [facility] #[id number]   Signed by Yoselin Bearden            ------------------------------------------  From: [facility] #[id number]  To: Shalom Velasco MD  Sent: July 24, 2019 11:56:30 AM CDT  Subject: Medication Management  Due: July 25, 2019 11:56:30 AM CDT    ** On Hold Pending Signature **  Drug: atorvastatin (atorvastatin 20 mg oral tablet)  1 TAB(S) ORAL DAILY  Quantity: 90 tab(s)     Days Supply: 0         Refills: 0  Substitutions Allowed  Notes from Pharmacy:     Dispensed Drug: atorvastatin (atorvastatin 20 mg oral tablet)  TAKE 1 TABLET BY MOUTH DAILY  Quantity: 90 tab(s)     Days Supply: 90        Refills: 0  Substitutions Allowed  Notes from Pharmacy:   ------------------------------------------Date of last office visit and reason:  4/15/19 Mole removal      Date of last Med Check / Px:   1/14/19 Pre Op  Date of last labs pertaining to med:  1/8/2019    RTC order in chart:  Placed 1/8/2019. Return in 1 year

## 2022-02-16 NOTE — LETTER
(Inserted Image. Unable to display)   April 09, 2019      CHASE [place]  [address]   [place], [place] [id number]        Dear CHASE,      Thank you for selecting [facility] ([facility]viously [facility] for your healthcare needs.      Our records indicate you are due for the following services:        Follow-up office visit for recheck of mole.      To schedule an appointment or if you have further questions, please contact your primary clinic:   [facility]       [phone]   [facility]       [phone]              [facility]     [phone]      Powered by [employer] and [employer]    Sincerely,    Shalom Velasco MD

## 2022-02-16 NOTE — PROGRESS NOTES
"Chief Complaint    Annual Physical  History of Present Illness      Patient here for annual physical. Has concerns with hernia, left groin. Pops out when laughing, coughing or when lifting something heavy.      Uses a sleep machine at night and works good.      Weight three years ago 232lbs.  Today s weight 231lbs.      Colon cancer screening status:  2022      Last Dental Exam:  UTD      Last Eye Exam: UTD      Immunizations:  Flu, refused      Caffeine intake, does have a cup of coffee in the morning.         Review of Systems      \"See HPI.  All other review of systems negative.\"  Physical Exam   Vitals & Measurements    T: 97.6   F (Tympanic)  HR: 74(Peripheral)  BP: 146/88     HT: 73 in  WT: 231 lb  BMI: 30.47       General: Alert and oriented, No acute distress.      Eye: Pupils are equal, round and reactive to light, Extraocular movements are intact, Normal conjunctiva.      HENT: Normocephalic, Tympanic membranes are clear, Oral mucosa is moist, No pharyngeal erythema, No sinus tenderness.      Neck: Supple, Non-tender, No carotid bruit, No lymphadenopathy, No thyromegaly.      Respiratory: Lungs are clear to auscultation, Respirations are non-labored, Breath sounds are equal, No chest wall tenderness.      Cardiovascular: Normal rate, Regular rhythm, No murmur, No gallop, Good pulses equal in all extremities, Normal peripheral perfusion, No edema.      Gastrointestinal: Soft, Non-tender, Non-distended, Normal bowel sounds, No organomegaly.      Genitourinary: No scrotal tenderness, left inguinal hernia, No urethral discharge..      Lymphatics: WNL.      Musculoskeletal: Normal range of motion, Normal strength, No tenderness, No swelling, No deformity.      Integumentary: Warm, Dry, No rash.      Neurologic: Alert, Oriented, Normal sensory, Normal motor function, No focal deficits.      Psychiatric: Cooperative, Appropriate mood & affect  Assessment/Plan       Annual physical exam (Z00.00)         Labs, " patient will be notified when results are available. Daily exercise program advised, 30 min or more of brisk walking. Monitor calorie intake in diet.   Follow up in 3 months for recheck mole. Return in one year for annual physical.         Ordered:          Glucose* ([facility]), Specimen Type: Serum, Collection Date: 01/08/19 8:14:00 CST          Lipid panel with reflex to direct ldl* ([facility]), Specimen Type: Serum, Collection Date: 01/08/19 8:14:00 CST          PSA, Total (Room)* ([facility]), Specimen Type: Serum, Collection Date: 01/08/19 8:14:00 CST                Inguinal hernia (K40.90)         Referral to a general surgeon.         Ordered:          Referral (Request), 01/08/19 8:42:00 CST, Referred to: General Surgery, Inguinal hernia                Screening, lipid (Z13.220)         Labs for cholesterol screening. Patient will be notified when results are available.         Ordered:          Lipid panel with reflex to direct ldl* ([facility]), Specimen Type: Serum, Collection Date: 01/08/19 8:14:00 CST                Orders:         Return to Clinic (Request), RFV: Mole check, Return in 3 months      ILala LPN, acted solely as a scribe for, and in the presence of Dr. Shalom Velasco who performed the services.  Patient Information     Name:CHASE ZAMORA      Address:      [address] [place] [id number]     Sex:Male     YOB: 1955     [phone]     Emergency Contact:LUCAS ZAMORA     MRN:[id number]     FIN:[id number]     Location:[facility]     Date of Service:01/08/2019      Primary Care Physician:       Shalom Velasco MD, [phone]      Attending Physician:       Shalom Velasco MD, [phone]  Problem List/Past Medical History    Ongoing     Obesity     DERECK (obstructive sleep apnea)     Tobacco user    Historical     Cyst, Arachnoid  Procedure/Surgical History     Polysomnogram - Split Night Study (01/27/2015)            Comments:  AHI 22.1/hr and heany snoring 80% of time.  CPAP titrated to +8cm H20 which decreased AHI to 0.9/hr and eliminated snoring..     Colonoscopy (03/19/2012)            Comments: Normal colonoscopy repeat in 10 years.     Bilateral vasectomy (02/23/1995)        Medications     aspirin 81 mg oral tablet: 81 mg, 1 tab(s), PO, daily, tab(s).     Cpap: 0 Refill(s).          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 Spouse/Partner name: Landy., 03/26/2013     Substance Abuse      Never, 12/16/2014     Tobacco      Past, 12/16/2014  Family History    Rheumatoid arthritis: Father.  Immunizations      Vaccine Date Status      tetanus/diphth/pertuss (Tdap) adult/adol 03/25/2013 Given      Td 05/03/2004 Recorded

## 2022-02-16 NOTE — TELEPHONE ENCOUNTER
---------------------  From: Santi TIAN, Ayla TINSLEY (Phone Messages Pool ([id number]_[facility]))   To: Sleep Message Pool ([id number]_[place] - [place]);     Sent: 2/11/2021 1:39:40 PM CST  Subject: Supplies     Phone message    PCP:   BRAULIO      Time of Call:  1338       Person Calling:  Pt  Phone number:  [phone]    Returned call at: _    Note:   Pt states he needs new CPAP mask and hose ordered. Please advise.    Last office visit and reason:  _I left a message for this patient to schedule an appointment with either Dr. Hernandez or Dr. Bentley for a CPAP/Sleep Apnea check up and Rx renewal.     It looks like the patient has not been seen for his sleep apnea since 2019.

## 2022-02-16 NOTE — PROGRESS NOTES
Chief Complaint    possible mole removal--has mole on back to have rechecked.  History of Present Illness      Patient is here to have some moles looked at again.  He has a mole on his back that was checked at his last visit, is unsure if there is any change in pattern.  He also has a mole on his left arm to have checked.  Review of Systems           See HPI.  All other review of systems negative.              Physical Exam   Vitals & Measurements    T: 97.4   F (Tympanic)  HR: 84(Peripheral)  BP: 124/80     HT: 73 in  WT: 224.4 lb  BMI: 29.6           General:  Alert and oriented, No acute distress.            Eye:  Pupils are equal, round, Normal conjunctiva.            Integumentary:  Warm, No rash.  multicomponent 2 mm nevus on lower mid back.          Psychiatric:  Cooperative, Appropriate mood & affect, Normal judgment.             Assessment/Plan       1. Back skin lesion (L98.9)         Skin was prepped with alcohol wipe and anesthetized with 2% lidocaine.  A 4mm punch biopsy was taken from a lesion on the lower mid back.  Three plain gut stitches were used for hemostasis.  Specimen was sent to pathology.  Patient will be notified of results when they become available.               I, Juliette Monterroso MA, acted solely as a scribe for, and in the presence of Dr. Shalom Velasco who performed the services.             I, Shalom Velasco MD, personally performed the services described in this documentation.  The documentation was scribed in my presence and is both accurate and complete.                Patient Information     Name:CHASE ZAMORA      Address:      [address] [place] [id number]     Sex:Male     YOB: 1955     [phone]     Emergency Contact:LUCAS ZAMORA     MRN:[id number]     FIN:[id number]     Location:[facility], [place]     Date of Service:04/15/2019      Primary Care Physician:       Shalom Velasco MD, [phone]      Attending  Physician:       Shalom Velasco MD, [phone]  Problem List/Past Medical History    Ongoing     Hypertension goal BP (blood pressure) < 130/80     Obesity     DERECK (obstructive sleep apnea)     Tobacco user    Historical     Cyst, arachnoid  Procedure/Surgical History     Polysomnogram - Split Night Study (01/27/2015)            Comments: AHI 22.1/hr and heany snoring 80% of time.  CPAP titrated to +8cm H20 which decreased AHI to 0.9/hr and eliminated snoring..     Colonoscopy (03/19/2012)            Comments: Normal colonoscopy repeat in 10 years.     Bilateral vasectomy (02/23/1995)        Medications     aspirin 81 mg oral tablet: 81 mg, 1 tab(s), PO, daily, tab(s).     Cpap: 0 Refill(s).     atorvastatin 20 mg oral tablet: 20 mg, 1 tab(s), PO, Daily, 90 tab(s), 1 Refill(s).          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 Spouse/Partner name: Landy., 03/26/2013     Nutrition and Health      Type of diet: Regular., 01/09/2019     Sexual      Sexually active: Yes. Identifies as male, Sexual orientation: Straight or heterosexual., 01/09/2019     Substance Abuse      Never, 12/16/2014     Tobacco      Past, 12/16/2014  Family History    Rheumatoid arthritis: Father.  Immunizations      Vaccine Date Status      tetanus/diphth/pertuss (Tdap) adult/adol 03/25/2013 Given      Td 05/03/2004 Recorded

## 2022-02-16 NOTE — PROGRESS NOTES
Patient:   CHASE ZAMORA            MRN: [id number]            FIN: [id number]               Age:   65 years     Sex:  Male     :  1955   Associated Diagnoses:   Hypertension goal BP (blood pressure) < 130/80; DERECK (obstructive sleep apnea)   Author:   Joselo Hernandez MD      Visit Information      Date of Service: 2021 08:13 am  Performing Location: [facility]  Encounter#: [id number]      Primary Care Provider (PCP):  Shalom Velasco MD    NPI# [id number]      Referring Provider:  Joselo Hernandez MD    NPI# [id number]      Chief Complaint   2021 8:16 AM CST    DERECK follow up        Subjective   Chief complaint 2021 8:16 AM CST    DERECK follow up  .     diagnosed  with DERECK>  has been on cpap at 8cm since.  uses nasal mirage mask.   his mask is old and a little stiff but otherwise doing very well.   since on cpap his blood pressure has improved.   he is not requiring hypertension medications.   he denies morning headaches or nasal congestion  cleans 1 time per week with soap and water      Health Status   Allergies:    Allergic Reactions (Selected)  Severity Not Documented  Penicillin (Rash)   Medications:  (Selected)   Prescriptions  Prescribed  CPAP Supplies: CPAP Supplies, See Instructions, Instructions: Heated humidifier x1; Humidifier chamber x1;  Heated tubing x1; Full face mask of choice with headgear  x1; Cushion x 1;\r\nFilters: Disposable x1pk & Reusable x1pk.\r\nLength of Need = 99 Months, Supply,...  atorvastatin 20 mg oral tablet: = 1 tab(s), Oral, daily, # 90 tab(s), 3 Refill(s), Type: Maintenance, [id number], 1 tab(s) Oral daily, 73, in, 21 8:19:00 CST, Height Measured, 224, lb, 21 8:19:00 CST, Weight Measured  triamcinolone 0.1% topical cream: 1 santa, Topical, bid, # 60 gm, 2 Refill(s), Type: Maintenance, [id number], 1 santa Topical bid, 73, in, 21 8:19:00 CST, Height Measured, 224, lb, 21 8:19:00  CST, Weight Measured  Documented Medications  Documented  Cpap: Cpap, Supply, 0 Refill(s), Type: Maintenance  aspirin 81 mg oral tablet: 1 tab(s) ( 81 mg ), PO, daily, tab(s), 0 Refill(s), Type: Maintenance,    Medications          *denotes recorded medication          *Cpap: 0 Refill(s).          CPAP Supplies: See Instructions, Heated humidifier x1; Humidifier chamber x1;  Heated tubing x1; Full face mask of choice with headgear  x1; Cushion x 1;: Disposable x1pk & Reusable x1pk.of Need = 99 Months, 1 EA, 11 Refill(s).          *aspirin 81 mg oral tablet: 81 mg, 1 tab(s), PO, daily, tab(s).          atorvastatin 20 mg oral tablet: 1 tab(s), Oral, daily, 90 tab(s), 3 Refill(s).          triamcinolone 0.1% topical cream: 1 santa, Topical, bid, 60 gm, 2 Refill(s).       Problem list:    All Problems (Selected)  Hypertension goal BP (blood pressure) < 130/80 / 0669889240 / Confirmed  DERECK (obstructive sleep apnea) / [id number] / Confirmed      Objective   Vital Signs   2/16/2021 8:16 AM CST Peripheral Pulse Rate 62 bpm    Systolic Blood Pressure 126 mmHg    Diastolic Blood Pressure 64 mmHg    Mean Arterial Pressure 85 mmHg      Measurements from flowsheet : Measurements   2/16/2021 8:16 AM CST Height Measured - Standard 73 in    Weight Measured - Standard 225 lb    BSA 2.29 m2    Body Mass Index 29.68 kg/m2  HI      General:  Alert and oriented, No acute distress.    Respiratory:  Lungs are clear to auscultation, Respirations are non-labored.    Cardiovascular:  Normal rate, Regular rhythm.    Integumentary:  Warm.    Psychiatric:  Cooperative, Appropriate mood & affect.       Results Review   Results review   Lab results   1/26/2021 7:59 AM CST Sodium Level 141 mmol/L    Potassium Level 4.5 mmol/L    Chloride Level 107 mmol/L    CO2 Level 29 mmol/L    Glucose Level 97 mg/dL    BUN 19 mg/dL    Creatinine 1.11 mg/dL    BUN/Creat Ratio NOT APPLICABLE    eGFR 69 mL/min/1.73m2    eGFR African American 80 mL/min/1.73m2     Calcium Level 9.2 mg/dL    Cholesterol 178 mg/dL    Non-    HDL 67 mg/dL    Chol/HDL Ratio 2.7    LDL 95    Triglyceride 73 mg/dL         Impression and Plan   Assessment and Plan:          Diagnosis: Hypertension goal BP (blood pressure) < 130/80 (MCG29-VT I10), DERECK (obstructive sleep apnea) (RFY12-CC G47.33).         Course: should continue cpap  pointed out the connection between use and blood pressure  reviewed types of masks and also cleaning machines.

## 2022-02-16 NOTE — TELEPHONE ENCOUNTER
Entered by Alis Mcdaniel CMA on January 13, 2021 7:01:11 AM CST  Last OV: 1/28/20 Well Adult Visit  Last Refill: 1/28/20 #90 3 refills  Last Labs: 1/28/20  RTC Due end of this month    1 month given per protocol.  MSG to appointment pool.  Alis Mcdaniel CMA.      ------------------------------------------  From: [id number]  To: Shalom Velasco MD  Sent: January 13, 2021 12:39:15 AM CST  Subject: Medication Management  Due: January 13, 2021 11:31:53 PM CST     ** On Hold Pending Signature **     Dispensed Drug: atorvastatin (atorvastatin 20 mg oral tablet), TAKE 1 TABLET BY MOUTH EVERY DAY  Quantity: 90 tab(s)  Days Supply: 90  Refills: 3  Substitutions Allowed  Notes from Pharmacy:  ---------------------------------------------------------------  From: Alis Mcdaniel CMA   To: [id number] [facility]    Sent: 1/13/2021 7:01:58 AM CST  Subject: Medication Management     ** Submitted: **  Order:atorvastatin (atorvastatin 20 mg oral tablet)  1 tab(s)  Oral  daily  Qty:  30 tab(s)        Refills:  0          Substitutions Allowed     [id number]    Signed by Alis Mcdaniel CMA  1/13/2021 1:01:00 PM [facility]    ** Submitted: **  Complete:atorvastatin (atorvastatin 20 mg oral tablet)   Signed by Alis Mcdaniel CMA  1/13/2021 1:01:00 PM [facility]    ** Not Approved:  **  atorvastatin (ATORVASTATIN 20 MG TABLET)  TAKE 1 TABLET BY MOUTH EVERY DAY  Qty:  90 tab(s)        Days Supply:  90        Refills:  3          Substitutions Allowed     [id number]   Signed by Alis Mcdaniel CMA

## 2022-02-16 NOTE — PROGRESS NOTES
Patient:   CHASE ZAMORA            MRN: [id number]            FIN: [id number]               Age:   65 years     Sex:  Male     :  1955   Associated Diagnoses:   Parotitis; Parotitis   Author:   Philip Gann PA-C      Chief Complaint   2021 2:01 PM CST   Pt here for Right jaw pain and edema x 2 days      History of Present Illness   3 day hx of right jaw pain, has gotten worse over the past 2 days,   now painful to eat, no tooth pain, no problems swallowing, no sore throat  has been taking some ibuprofen      Review of Systems   Constitutional:  Negative.    Ear/Nose/Mouth/Throat:  Jaw pain, No ear pain, No sinus pain.    Respiratory:  Negative.       Health Status   Allergies:    Allergic Reactions (Selected)  Severity Not Documented  Penicillin (Rash)   Medications:  (Selected)   Prescriptions  Prescribed  CPAP Supplies: CPAP Supplies, See Instructions, Instructions: Heated humidifier x1; Humidifier chamber x1;  Heated tubing x1; Full face mask of choice with headgear  x1; Cushion x 1;\r\nFilters: Disposable x1pk & Reusable x1pk.\r\nLength of Need = 99 Months, Supply,...  atorvastatin 20 mg oral tablet: = 1 tab(s), Oral, daily, # 90 tab(s), 3 Refill(s), Type: Maintenance, [id number], 1 tab(s) Oral daily, 73, in, 21 8:19:00 CST, Height Measured, 224, lb, 21 8:19:00 CST, Weight Measured  triamcinolone 0.1% topical cream: 1 santa, Topical, bid, # 60 gm, 2 Refill(s), Type: Maintenance, [id number], 1 santa Topical bid, 73, in, 21 8:19:00 CST, Height Measured, 224, lb, 21 8:19:00 CST, Weight Measured  Documented Medications  Documented  aspirin 81 mg oral tablet: 1 tab(s) ( 81 mg ), PO, daily, tab(s), 0 Refill(s), Type: Maintenance   Problem list:    All Problems (Selected)  Hypertension goal BP (blood pressure) < 130/80 / SNOMED CT 2577827369 / Confirmed  DERECK (obstructive sleep apnea) / SNOMED CT 443049102 / Confirmed  Obesity / SNOMED CT  5344087833 / Probable      Histories   Past Medical History:    Active  DERECK (obstructive sleep apnea) (275719805): Onset on 1/27/2015 at 59 years.  Hypertension goal BP (blood pressure) < 130/80 (3984186123)  Resolved  Cyst, arachnoid (62876069): Onset in the month of 6/2000 at 44 years  Resolved.   Family History:    Rheumatoid arthritis  Father  Alzheimer's disease  Mother  HTN - Hypertension  Father     Procedure history:    Bilateral inguinal hernia repair (450708286) on 1/22/2019 at 63 Years.  Polysomnogram - Split Night Study on 1/27/2015 at 59 Years.  Comments:  2/11/2015 1:20 PM CST - Rika Serna CMA  AHI 22.1/hr and heany snoring 80% of time.  CPAP titrated to +8cm H20 which decreased AHI to 0.9/hr and eliminated snoring.  Colonoscopy (937301969) on 3/19/2012 at 56 Years.  Comments:  8/9/2012 9:17 AM CDT - Patricia Marcano MA  Normal colonoscopy repeat in 10 years  Bilateral vasectomy (752921799) on 2/23/1995 at 39 Years.   Social History:        Electronic Cigarette/Vaping Assessment            Electronic Cigarette Use: Never.      Alcohol Assessment            Current, 7 TIMES PER WEEK      Tobacco Assessment            Started at age 21, stopped age 43., Snuff            Past                     Comments:                      02/21/2012 - Sade Mendez                     occasional cigar      Substance Abuse Assessment            Never      Employment and Education Assessment            Employed, Work/School description: 3M .      Home and Environment Assessment            Marital status: .  Spouse/Partner name: Landy.      Nutrition and Health Assessment            Type of diet: Regular.      Exercise and Physical Activity Assessment            Exercise frequency: 3-4 times/week.      Sexual Assessment            Sexually active: Yes.  Identifies as male, Sexual orientation: Straight or heterosexual.  History of STD: No.               Contraceptive Use Details: None.  History of sexual  abuse: No.        Physical Examination   Vital Signs   11/18/2021 2:28 PM CST Systolic Blood Pressure 158 mmHg  HI    Diastolic Blood Pressure 78 mmHg    Mean Arterial Pressure 105 mmHg    BP Site Right arm    BP Method Manual   11/18/2021 2:01 PM CST Temperature Tympanic 97.9 DegF    Peripheral Pulse Rate 80 bpm    Pulse Site Radial artery    Respiratory Rate 16 br/min    Systolic Blood Pressure 186 mmHg  HI    Diastolic Blood Pressure 72 mmHg    Mean Arterial Pressure 110 mmHg    BP Site Right arm      Measurements from flowsheet : Measurements   11/18/2021 2:01 PM CST Height Measured - Standard 73 in    Weight Measured - Standard 232 lb    BSA 2.33 m2    Body Mass Index 30.61 kg/m2  HI      General:  No acute distress.    HENT:  Tympanic membranes are clear, No sinus tenderness, nares are patent, some swelling and tenderness over right TMJ and angle of jaw.    Neck:  Supple, Non-tender, No lymphadenopathy.       Impression and Plan   Diagnosis     Parotitis (IES52-ZY K11.20).     Summary:  will treat with cephalexin,  use ibuprofen tid, heat over the area, hard candy (lemon drops, sour drops), soft diet, follow up if not improving, blood pressure likely elevated today due to pain, will continue to monitor.    Orders     Orders   Pharmacy:  cephalexin 500 mg oral capsule (Prescribe): = 1 cap(s) ( 500 mg ), Oral, qid, x 10 day(s), # 40 cap(s), 0 Refill(s), Type: Acute, Pharmacy: [facility] [id number] [facility], 1 cap(s) Oral qid,x10 day(s), 73, in, 11/18/2021 2:01 PM CST, Height Measured, 232, lb, 11/18/2021 2:01 PM CST, Weight Measured.     Orders   Charges (Evaluation and Management):  61231 office o/p est low 20-29 min (Charge) (Order): Quantity: 1, Parotitis.

## 2022-02-16 NOTE — TELEPHONE ENCOUNTER
Entered by Florencia James CMA on October 28, 2019 2:02:07 PM CDT  ---------------------  From: Florencia James CMA   To: [facility] #[id number]    Sent: 10/28/2019 2:02:07 PM CDT  Subject: Medication Management     ** Submitted: **  Order:atorvastatin (atorvastatin 20 mg oral tablet)  1 tab(s)  Oral  daily  Qty:  90 tab(s)        Days Supply:  90        Refills:  0          Substitutions Allowed     Route To [facility] #[id number]    Signed by Florencia James CMA  10/28/2019 2:01:00 PM    ** Submitted: **  Complete:atorvastatin (atorvastatin 20 mg oral tablet)   Signed by Florencia James CMA  10/28/2019 2:02:00 PM    ** Not Approved:  **  atorvastatin (ATORVASTATIN 20MG TABLETS)  TAKE 1 TABLET BY MOUTH DAILY  Qty:  90 tab(s)        Days Supply:  90        Refills:  0          Substitutions Allowed     Route To [facility] #[id number]   Signed by Florencia James CMA            ------------------------------------------  From: [facility] #[id number]  To: Shalom Velasco MD  Sent: October 28, 2019 11:57:40 AM CDT  Subject: Medication Management  Due: October 29, 2019 11:57:40 AM CDT    ** On Hold Pending Signature **  Drug: atorvastatin (atorvastatin 20 mg oral tablet)  1 TAB(S) ORAL DAILY  Quantity: 90 tab(s)  Days Supply: 0  Refills: 0  Substitutions Allowed  Notes from Pharmacy:     Dispensed Drug: atorvastatin (atorvastatin 20 mg oral tablet)  TAKE 1 TABLET BY MOUTH DAILY  Quantity: 90 tab(s)  Days Supply: 90  Refills: 0  Substitutions Allowed  Notes from Pharmacy:   ------------------------------------------Med Refill      Date of last office visit and reason:  4/15/19; mole removal      Date of last Med Check / Px:   1/8/19; annual physical   Date of last labs pertaining to med:  1/8/19    RTC order in chart:  yes; January    For Protocol refill, has patient been contacted:  n/a

## 2022-02-16 NOTE — NURSING NOTE
CAGE Assessment Entered On:  1/28/2020 9:00 AM CST    Performed On:  1/28/2020 9:00 AM CST by Juliette Monterroso MA               Assessment   Have you ever felt you should cut down on your drinking :   No   Have people annoyed you by criticizing your drinking :   No   Have you ever felt bad or guilty about your drinking :   No   Have you ever taken a drink first thing in the morning to steady your nerves or get rid of a hangover (Eye-opener) :   No   CAGE Score :   0    Juliette Monterroso MA - 1/28/2020 9:00 AM CST

## 2022-02-16 NOTE — NURSING NOTE
Comprehensive Intake Entered On:  4/15/2019 8:16 AM CDT    Performed On:  4/15/2019 8:10 AM CDT by Loc PERSON, Juliette               Summary   Chief Complaint :   possible mole removal--has mole on back to have rechecked.   Weight Measured :   224.4 lb(Converted to: 224 lb 6 oz, 101.79 kg)    Height Measured :   73 in(Converted to: 6 ft 1 in, 185.42 cm)    Body Mass Index :   29.6 kg/m2 (HI)    Body Surface Area :   2.29 m2   Systolic Blood Pressure :   124 mmHg   Diastolic Blood Pressure :   80 mmHg   Mean Arterial Pressure :   95 mmHg   Peripheral Pulse Rate :   84 bpm   BP Site :   Right arm   Pulse Site :   Radial artery   BP Method :   Manual   HR Method :   Manual   Temperature Tympanic :   97.4 DegF(Converted to: 36.3 DegC)  (LOW)    Juliette Monterroso MA - 4/15/2019 8:10 AM CDT   Health Status   Allergies Verified? :   Yes   Medication History Verified? :   Yes   Medical History Verified? :   Yes   Pre-Visit Planning Status :   Completed   Tobacco Use? :   Former smoker   Juliette Monterroso MA - 4/15/2019 8:10 AM CDT   Consents   Consent for Immunization Exchange :   Consent Granted   Consent for Immunizations to Providers :   Consent Granted   Juliette Monterroso MA - 4/15/2019 8:10 AM CDT   Meds / Allergies   (As Of: 4/15/2019 8:16:37 AM CDT)   Allergies (Active)   penicillin  Estimated Onset Date:   Unspecified ; Reactions:   rash ; Created By:   Sade Mendez; Reaction Status:   Active ; Category:   Drug ; Substance:   penicillin ; Type:   Allergy ; Updated By:   Sade Mendez; Reviewed Date:   11/11/2015 5:32 PM CST        Medication List   (As Of: 4/15/2019 8:16:37 AM CDT)   Prescription/Discharge Order    atorvastatin  :   atorvastatin ; Status:   Prescribed ; Ordered As Mnemonic:   atorvastatin 20 mg oral tablet ; Simple Display Line:   20 mg, 1 tab(s), PO, Daily, 90 tab(s), 1 Refill(s) ; Ordering Provider:   Shalom Velasco MD; Catalog Code:   atorvastatin ; Order Dt/Tm:   1/14/2019 8:08:57 AM             Home Meds    aspirin  :   aspirin ; Status:   Documented ; Ordered As Mnemonic:   aspirin 81 mg oral tablet ; Simple Display Line:   81 mg, 1 tab(s), PO, daily, tab(s) ; Catalog Code:   aspirin ; Order Dt/Tm:   2/22/2012 2:57:02 PM          Miscellaneous Rx Supply  :   Miscellaneous Rx Supply ; Status:   Documented ; Ordered As Mnemonic:   Cpap ; Simple Display Line:   0 Refill(s) ; Catalog Code:   Miscellaneous Rx Supply ; Order Dt/Tm:   1/8/2019 8:03:55 AM            Social History   Social History   (As Of: 4/15/2019 8:16:37 AM CDT)   Alcohol:        Current, 7 TIMES PER WEEK   (Last Updated: 1/9/2019 2:43:06 PM CST by Virgen Anderson)          Tobacco:        Past   Comments:  2/21/2012 9:44 AM - Sade Mendez: occasional cigar   (Last Updated: 12/16/2014 2:50:07 PM CST by Juliette Monterroso MA)          Substance Abuse:        Never   (Last Updated: 12/16/2014 2:50:12 PM CST by Juliette Monterroso MA)          Employment and Education:        Employed, Work/School description: 3M .   (Last Updated: 3/26/2013 7:30:35 AM CDT by Sandra Benavidez)          Home and Environment:        Marital status: .  Spouse/Partner name: Landy.   (Last Updated: 3/26/2013 7:30:44 AM CDT by Sandra Benavidez)          Nutrition and Health:        Type of diet: Regular.   (Last Updated: 1/9/2019 8:23:49 AM CST by Elif Harrison)          Exercise and Physical Activity:        Exercise frequency: 2-3 times per week.   (Last Updated: 1/9/2019 8:24:00 AM CST by Elif Harrison)          Sexual:        Sexually active: Yes.  Identifies as male, Sexual orientation: Straight or heterosexual.   (Last Updated: 1/9/2019 8:24:08 AM CST by Elif Harrison)

## 2022-02-16 NOTE — TELEPHONE ENCOUNTER
---------------------  From: Maritza Stewart LPN (Phone Messages Pool ([id number]_[facility]))   To: Sleep Message Pool ([id number]_[facility]); BRENDAN ZAMORA    Sent: 2/12/2021 8:14:51 AM CST  Subject: CONSUMER MESSAGE FW: CPAP Question     Hi Brendan,    Your message from yesterday was forwarded on. Someone will be reaching out to you to get your supplies ordered.    Thanks,  Maritza ECKERT LPN        ---------------------  From: BRENDAN ZAMORA  To: [facility]  Sent: 02/12/2021 08:06 a.m. CST  Subject: CPAP Question  Who do I need to contact to order a new CPAP mask and hose? Thanks for your help.Left a message for patient to call back and schedule an appointment with Dr. Hernandez or Dr. Bentley for a CPAP/Sleep Apnea check up and Rx renewal.    It looks like this patient has not been seen for Sleep Apnea since 2019.

## 2022-02-16 NOTE — LETTER
(Inserted Image. Unable to display) January 09, 2019Re: CHASE OHLSENDOB:  1955[place] [address]Ed: Dr. YuThe following patient has been referred to your office/practice:   CHASE URBANENAppointment : 01/10/2019 @ 2:30pmLocation : [facility] General SurgeryPlease refer to the attached clinical documentation for a summary of CHASE's care.  Please do not hesitate to contact our office if any additional clinical questions arise. All relevant records and transition of care documents should be mailed or faxed.Your assistance in providing continuity of care is appreciatedSincerely, Counts include 234 beds at the [facility] & [address] [address](P) [phone](F) [phone]

## 2022-02-16 NOTE — TELEPHONE ENCOUNTER
---------------------  From: Shalom Velasco MD   To: CHASE ZAMORA    Sent: 1/27/2021 11:51:31 AM CST  Subject: Patient Message - Results Notification      Your tests look good.  We will discuss the results at your upcoming visit.      Results:  Date Result Name Value Ref Range   1/26/2021 7:59 AM Sodium Level 141 mmol/L (135 - 146)   1/26/2021 7:59 AM Potassium Level 4.5 mmol/L (3.5 - 5.3)   1/26/2021 7:59 AM Chloride Level 107 mmol/L (98 - 110)   1/26/2021 7:59 AM CO2 Level 29 mmol/L (20 - 32)   1/26/2021 7:59 AM Glucose Level 97 mg/dL (65 - 99)   1/26/2021 7:59 AM BUN 19 mg/dL (7 - 25)   1/26/2021 7:59 AM Creatinine Level 1.11 mg/dL (0.70 - 1.25)   1/26/2021 7:59 AM eGFR 69 mL/min/1.73m2 (> OR = 60 - )   1/26/2021 7:59 AM Calcium Level 9.2 mg/dL (8.6 - 10.3)   1/26/2021 7:59 AM Cholesterol 178 mg/dL ( - <200)   1/26/2021 7:59 AM Non-HDL Cholesterol 111 ( - <130)   1/26/2021 7:59 AM HDL 67 mg/dL (> OR = 40 - )   1/26/2021 7:59 AM Cholesterol/HDL Ratio 2.7 ( - <5.0)   1/26/2021 7:59 AM LDL 95    1/26/2021 7:59 AM Triglyceride 73 mg/dL ( - <150)

## 2022-02-16 NOTE — NURSING NOTE
Comprehensive Intake Entered On:  2/16/2021 8:19 AM CST    Performed On:  2/16/2021 8:16 AM CST by Kaylan Louis CMA               Summary   Chief Complaint :   DERECK follow up   Advance Directive :   No   Weight Measured :   225 lb(Converted to: 225 lb 0 oz, 102.058 kg)    Height Measured :   73 in(Converted to: 6 ft 1 in, 185.42 cm)    Body Mass Index :   29.68 kg/m2 (HI)    Body Surface Area :   2.29 m2   Systolic Blood Pressure :   126 mmHg   Diastolic Blood Pressure :   64 mmHg   Mean Arterial Pressure :   85 mmHg   Peripheral Pulse Rate :   62 bpm   Kaylan Louis CMA - 2/16/2021 8:16 AM CST   Health Status   Allergies Verified? :   Yes   Medication History Verified? :   Yes   Pre-Visit Planning Status :   Completed   Tobacco Use? :   Former smoker   Kaylan Louis CMA - 2/16/2021 8:16 AM CST   Consents   Consent for Immunization Exchange :   Consent Granted   Consent for Immunizations to Providers :   Consent Granted   Kaylan Louis CMA - 2/16/2021 8:16 AM CST   Meds / Allergies   (As Of: 2/16/2021 8:19:40 AM CST)   Allergies (Active)   penicillin  Estimated Onset Date:   Unspecified ; Reactions:   rash ; Created By:   Sade Mendez; Reaction Status:   Active ; Category:   Drug ; Substance:   penicillin ; Type:   Allergy ; Updated By:   Sade Mendez; Reviewed Date:   1/28/2020 9:14 AM CST        Medication List   (As Of: 2/16/2021 8:19:40 AM CST)   Prescription/Discharge Order    atorvastatin  :   atorvastatin ; Status:   Prescribed ; Ordered As Mnemonic:   atorvastatin 20 mg oral tablet ; Simple Display Line:   1 tab(s), Oral, daily, 90 tab(s), 3 Refill(s) ; Ordering Provider:   Shalom Velasco MD; Catalog Code:   atorvastatin ; Order Dt/Tm:   2/1/2021 8:50:21 AM CST          triamcinolone topical  :   triamcinolone topical ; Status:   Prescribed ; Ordered As Mnemonic:   triamcinolone 0.1% topical cream ; Simple Display Line:   1 santa, Topical, bid, 60 gm, 2 Refill(s) ; Ordering  Provider:   Shalom Velasco MD; Catalog Code:   triamcinolone topical ; Order Dt/Tm:   2/1/2021 9:03:29 AM CST            Home Meds    aspirin  :   aspirin ; Status:   Documented ; Ordered As Mnemonic:   aspirin 81 mg oral tablet ; Simple Display Line:   81 mg, 1 tab(s), PO, daily, tab(s) ; Catalog Code:   aspirin ; Order Dt/Tm:   2/22/2012 2:57:02 PM CST          Miscellaneous Rx Supply  :   Miscellaneous Rx Supply ; Status:   Documented ; Ordered As Mnemonic:   Cpap ; Simple Display Line:   0 Refill(s) ; Catalog Code:   Miscellaneous Rx Supply ; Order Dt/Tm:   1/8/2019 8:03:55 AM CST            ID Risk Screen   Recent Travel History :   No recent travel   Family Member Travel History :   No recent travel   Other Exposure to Infectious Disease :   Unknown   COVID-19 Testing Status :   No COVID-19 test performed   Kaylan Louis CMA - 2/16/2021 8:16 AM CST

## 2022-02-16 NOTE — LETTER
(Inserted Image. Unable to display)         January 09, 2020        CHASE ZAMORA  [address]   [place] [id number]        Dear CHASE,    Thank you for selecting [facility] for your healthcare needs.    Our records indicate you are due for the following services:     Annual Physical     To schedule an appointment or if you have further questions, please contact your primary clinic:   [facility]       [phone]   [facility]       [phone]              [facility]     [phone]      Powered by [employer]    Sincerely,    Shalom Velasco MD

## 2022-02-16 NOTE — NURSING NOTE
Medicare Visit Entered On:  2/1/2021 8:26 AM CST    Performed On:  2/1/2021 8:19 AM CST by Lala Tuttle LPN               Summary   Chief Complaint :   AWV-medicare   Weight Measured :   224 lb(Converted to: 224 lb 0 oz, 101.605 kg)    Height Measured :   73 in(Converted to: 6 ft 1 in, 185.42 cm)    Body Mass Index :   29.55 kg/m2 (HI)    Body Surface Area :   2.29 m2   Systolic Blood Pressure :   132 mmHg (HI)    Diastolic Blood Pressure :   78 mmHg   Mean Arterial Pressure :   96 mmHg   Peripheral Pulse Rate :   82 bpm   BP Site :   Right arm   Pulse Site :   Radial artery   BP Method :   Manual   HR Method :   Manual   Lala Tuttle LPN - 2/1/2021 8:19 AM CST   Health Status   Allergies Verified? :   Yes   Medication History Verified? :   Yes   Pre-Visit Planning Status :   Completed   Tobacco Use? :   Former smoker   Lala Tuttle LPN - 2/1/2021 8:19 AM CST   Consents   Consent for Immunization Exchange :   Consent Granted   Consent for Immunizations to Providers :   Consent Granted   Lala Tuttle LPN - 2/1/2021 8:19 AM CST   Meds / Allergies   (As Of: 2/1/2021 8:26:39 AM CST)   Allergies (Active)   penicillin  Estimated Onset Date:   Unspecified ; Reactions:   rash ; Created By:   Sade Mendez; Reaction Status:   Active ; Category:   Drug ; Substance:   penicillin ; Type:   Allergy ; Updated By:   Sade Mendez; Reviewed Date:   1/28/2020 9:14 AM CST        Medication List   (As Of: 2/1/2021 8:26:39 AM CST)   Prescription/Discharge Order    atorvastatin  :   atorvastatin ; Status:   Prescribed ; Ordered As Mnemonic:   atorvastatin 20 mg oral tablet ; Simple Display Line:   1 tab(s), Oral, daily, 30 tab(s), 0 Refill(s) ; Ordering Provider:   Shalom Velasco MD; Catalog Code:   atorvastatin ; Order Dt/Tm:   1/13/2021 7:01:20 AM CST            Home Meds    aspirin  :   aspirin ; Status:   Documented ; Ordered As Mnemonic:   aspirin 81 mg oral tablet ; Simple Display Line:   81 mg, 1  tab(s), PO, daily, tab(s) ; Catalog Code:   aspirin ; Order Dt/Tm:   2/22/2012 2:57:02 PM CST          Miscellaneous Rx Supply  :   Miscellaneous Rx Supply ; Status:   Documented ; Ordered As Mnemonic:   Cpap ; Simple Display Line:   0 Refill(s) ; Catalog Code:   Miscellaneous Rx Supply ; Order Dt/Tm:   1/8/2019 8:03:55 AM CST            Social History   Social History   (As Of: 2/1/2021 8:26:39 AM CST)   Alcohol:        Current, 7 TIMES PER WEEK   (Last Updated: 1/9/2019 2:43:06 PM CST by Virgen Anderson)          Tobacco:        Past   Comments:  2/21/2012 9:44 AM - Sade Mendez: occasional cigar   (Last Updated: 12/16/2014 2:50:07 PM CST by Juliette Monterroso MA)   Started at age 21, stopped age 43., Snuff   (Last Updated: 2/1/2021 8:21:21 AM CST by Lala Tuttle LPN)          Electronic Cigarette/Vaping:        Electronic Cigarette Use: Never.   (Last Updated: 2/1/2021 8:21:07 AM CST by Lala Tuttle LPN)          Substance Abuse:        Never   (Last Updated: 12/16/2014 2:50:12 PM CST by Juliette Monterroso MA)          Employment/School:        Employed, Work/School description: 3M .   (Last Updated: 3/26/2013 7:30:35 AM CDT by Sandra Benavidez)          Home/Environment:        Marital status: .  Spouse/Partner name: Landy.   (Last Updated: 3/26/2013 7:30:44 AM CDT by Sandra Benavidez)          Nutrition/Health:        Type of diet: Regular.   (Last Updated: 1/9/2019 8:23:49 AM CST by Elif Harrison)          Exercise:        Exercise frequency: 3-4 times/week.   (Last Updated: 1/28/2020 9:09:55 AM CST by Juliette Monterroso MA)          Sexual:        Sexually active: No.  Identifies as male, Sexual orientation: Straight or heterosexual.   (Last Updated: 1/28/2020 9:10:10 AM CST by Juliette Monterroso MA)            Geriatric Depression Screening   Geriatric Depression Satisfied Life :   Yes   Geriatric Depression Dropped Activities :   No   Geriatric Depression Life Empty :   No   Geriatric Depression  Bored :   No   Geriatric Depression Good Spirits :   Yes   Geriatric Depression Afraid Bad Things :   No   Geriatric Depression Feel Happy :   No   Geriatric Depression Feel Helpless :   No   Geriatric Depression Prefer to Stay Home :   No   Geriatric Depression Memory Problems :   No   Geriatric Depression Wonderful Be Alive :   No   Geriatric Depression Feel Worthless :   No   Geriatric Depression Situation Hopeless :   No   Geriatric Depression Others Better Off :   No   Geriatric Depression Full of Energy :   No   Geriatric Depression Total Score :   3    Lala Tuttle LPN 2/1/2021 8:19 AM CST   Home Safety Screen   Emergency Numbers Kept/Updated :   Yes   Aware of Smoking Dangers :   Yes   Smoke Alarms/Fire Extinguisher Available :   Yes   Household Members Fire Safety Knowledge :   Yes   Firearms Unloaded and Secure :   Yes   Floor Rugs Removed or Fastened :   No   Mats in Bathtub/Shower :   No   Stairway Rails or Banisters :   Yes   Outdoor Clutter Safety :   Yes   Indoor Clutter Safety :   Yes   Electrical Cord Safety :   Yes   Lala Tuttle LPN 2/1/2021 8:19 AM CST   Advance Directives   Advance Directive :   No   Lala Tuttle LPN 2/1/2021 8:19 AM CST   Sr Fall Risk   History of Fall in Last 3 Months Sr :   No   Lala Tuttle LPN 2/1/2021 8:19 AM CST   Functional Assessment   Focused Functional Assessment Grid   Bathing :   Independent (2)   Dressing :   Independent (2)   Toileting :   Independent (2)   Transferring Bed or Chair :   Independent (2)   Continence :   Independent (2)   Feeding :   Independent (2)   Lala Tuttle LPN 2/1/2021 8:19 AM CST   ADL Index Score :   12    Capable of Shopping :   Yes   Capable of Walking :   Yes   Capable of Housekeeping :   Yes   Capable of Managing Medications :   Yes   Capable of Handling Finances :   Yes   Lala Tuttle LPN 2/1/2021 8:19 AM CST

## 2022-02-16 NOTE — PROGRESS NOTES
Patient:   CHASE ZAMORA            MRN: [id number]            FIN: [id number]               Age:   64 years     Sex:  Male     :  1955   Associated Diagnoses:   Diabetes mellitus screening; Elevated LDL cholesterol level; Hypertension goal BP (blood pressure) < 130/80; Obesity; DERECK (obstructive sleep apnea)   Author:   Shalom Velasco MD      Visit Information      Primary Care Provider (PCP):  Shalom Velasco MD    NPI# [id number]      Chief Complaint   2020 9:00 AM CST    AWV      Well Adult History   DREECK -Uses a sleep machine for DERECK, feels well rested. Has had machine for 5 years without machine check-up or card analysis.   HERNIA REPAIR - Doing well since recent surgery. Denies nausea, vomiting, diarrhea, constipation, abdominal pain.   HYPERLIPIDEMIA - Taking statin regularly. Denies myalgia, changes in cognition.   Today's weight is 233lbs. Annual visit 2019 was 232lbs.  SKIN- Prior mole Bx on Pt's back without concerning pathology. Denies new or changing in shape/color/size, painful, itching or non-healing skin lesions.    HEALTH MAINTANCE:  Colon cancer screening status: Due   Last Dental Exam: UTD  Last Eye Exam: UTD  Immunizations: Flu, Declined. Pneumococcal, due today           Review of Systems   Constitutional:  No fever, No chills, No sweats, No weakness, No fatigue.    Eye:  No recent visual problem, No blurring, No double vision.    Ear/Nose/Mouth/Throat:  Anterrior Rhinorrhea. , No decreased hearing, No ear pain, No nasal congestion, No sore throat.    Respiratory:  No shortness of breath, No cough, No sputum production, No hemoptysis, No wheezing.    Cardiovascular:  No chest pain, No palpitations, No peripheral edema, No syncope.    Gastrointestinal:  No nausea, No vomiting, No diarrhea, No constipation, No abdominal pain.    Genitourinary:  No dysuria, No change in urine stream.    Hematology/Lymphatics:  No bruising tendency, No bleeding tendency.    Endocrine:  No  Pt. is here for a 3 day check.States birth wt. was 7 pounds 11oz. and discharge wt. was 7 pounds 6oz. He breastfeeds for 30-40 min. every 2-3 hours. He had 3 wet diapers yest. and 1 today. Stooling well and was brownish today. Mom thinks her milk came in a little last night. He was spitting up yellow during night.  Denies known Latex allergy or symptoms of Latex sensitivity.Currently is not taking any medications. Weighed without diaper.   There are no preventive care reminders to display for this patient. -8%    Patient is up to date, no discussion needed.     excessive thirst, No polyuria, No cold intolerance, No heat intolerance, No excessive hunger.    Musculoskeletal:  No back pain, No neck pain, No muscle pain.    Integumentary:  No rash, No pruritus, No breakdown.    Neurologic:  Alert and oriented X4, No confusion, No numbness, No tingling.    Psychiatric:  Negative.       Health Status   Allergies:    Allergic Reactions (Selected)  Severity Not Documented  Penicillin (Rash)   Medications:  (Selected)   Prescriptions  Prescribed  atorvastatin 20 mg oral tablet: = 1 tab(s), Oral, daily, # 90 tab(s), 0 Refill(s), Type: Maintenance, Pharmacy: [id number]  Documented Medications  Documented  Cpap: Cpap, Supply, 0 Refill(s), Type: Maintenance  aspirin 81 mg oral tablet: 1 tab(s) ( 81 mg ), PO, daily, tab(s), 0 Refill(s), Type: Maintenance,    Medications          *denotes recorded medication          *Cpap: 0 Refill(s).          *aspirin 81 mg oral tablet: 81 mg, 1 tab(s), PO, daily, tab(s).          atorvastatin 20 mg oral tablet: 1 tab(s), Oral, daily, 90 tab(s), 0 Refill(s).       Problem list:    All Problems (Selected)  Hypertension goal BP (blood pressure) < 130/80 / SNOMED CT 7992532187 / Confirmed  Obesity / SNOMED CT 2377724471 / Probable  DERECK (obstructive sleep apnea) / SNOMED CT 168860953 / Confirmed      Histories   Past Medical History:    Active  DERECK (obstructive sleep apnea) (051772131): Onset on 1/27/2015 at 59 years.  Hypertension goal BP (blood pressure) < 130/80 (9176201860)  Resolved  Cyst, arachnoid (05043557): Onset in the month of 6/2000 at 44 years  Resolved.   Family History:    Rheumatoid arthritis  Father  Alzheimer's disease  Mother  HTN - Hypertension  Father     Procedure history:    Bilateral inguinal hernia repair (899292533) on 1/22/2019 at 63 Years.  Polysomnogram - Split Night Study on 1/27/2015 at 59 Years.  Comments:  2/11/2015 1:20 PM CST - Rika Serna CMA  AHI 22.1/hr and heany snoring 80% of time.  CPAP  titrated to +8cm H20 which decreased AHI to 0.9/hr and eliminated snoring.  Colonoscopy (674634965) on 3/19/2012 at 56 Years.  Comments:  8/9/2012 9:17 AM CDT - Patricia Marcano MA  Normal colonoscopy repeat in 10 years  Bilateral vasectomy (407600577) on 2/23/1995 at 39 Years.   Social History:        Alcohol Assessment            Current, 7 TIMES PER WEEK, 1-2 beers per night, never to excess.       Tobacco Assessment            Started at age 21, stopped age 43., Snuff            Past                     Comments:                      02/21/2012 - Sade Mendez                     occasional cigar      Substance Abuse Assessment            Never      Employment and Education Assessment            Employed, Work/School description: 3M .      Home and Environment Assessment            Marital status: .  Spouse/Partner name: Landy.      Nutrition and Health Assessment            Type of diet: Regular.      Exercise and Physical Activity Assessment            Exercise frequency: 3-4 times/week.      Sexual Assessment            Sexually active: No.  Identifies as male, Sexual orientation: Straight or heterosexual.        Physical Examination   Vital Signs   1/28/2020 9:00 AM CST Temperature Tympanic 97.5 DegF  LOW    Peripheral Pulse Rate 80 bpm    Pulse Site Radial artery    HR Method Manual    Systolic Blood Pressure 124 mmHg    Diastolic Blood Pressure 74 mmHg    Mean Arterial Pressure 91 mmHg    BP Site Right arm    BP Method Manual      Measurements from flowsheet : Measurements   1/28/2020 9:00 AM CST Height Measured - Standard 73 in    Weight Measured - Standard 233 lb    BSA 2.33 m2    Body Mass Index 30.74 kg/m2  HI      Eye:  Extraocular movements are intact, Normal conjunctiva.    HENT:  Normocephalic, Tympanic membranes are clear, No pharyngeal erythema.    Neck:  Supple, Non-tender, No lymphadenopathy, No thyromegaly.    Respiratory:  Lungs are clear to auscultation, Respirations are  non-labored.    Cardiovascular:  Normal rate, Regular rhythm.    Gastrointestinal:  Soft, Non-tender, Normal bowel sounds.    Genitourinary:  No costovertebral angle tenderness.    Musculoskeletal:  Normal range of motion, Normal strength, No tenderness.    Integumentary:  Warm, Dry, Pink.    Neurologic:  Alert, Oriented, Normal sensory, Normal motor function, Cranial Nerves II-XII are grossly intact.    Psychiatric:  Appropriate mood & affect.       Impression and Plan   Diagnosis     Diabetes mellitus screening (BYG22-WZ Z13.1).     Elevated LDL cholesterol level (IAA93-PJ E78.00).     Hypertension goal BP (blood pressure) < 130/80 (MYS97-JX I10).     Obesity (RTE25-OC E66.9).     DERECK (obstructive sleep apnea) (ITK47-LU G47.33).     Plan:  HTN; HYPERLIPIDEMIA  Chronic stable  -Lipids ordered today  -Continue to recommend low sodium diet - DASH  -Continue atorvastatin 20 mg daily, and ASA 81mg.       OBESITY  A1c testing today  Encouraged Pt to increase exercise to 5+ days per week (currently ~3)  -Continue to recommend diet high in vegetables, with whole grains, and minimize processed/pre-prepared food.     DERECK  Chronic Stable. Uses CPAP every night, feels well rested.   -Pt to bring in CPAP machine for inspection/cleaning and to examine memory card if present.    HERNIA REPAIR - BILATERAL  Healed, no concerns today.     HEALTH MAINTANENCE:  Pneumococcal administered today.  Declines flue today     .    Orders     Orders (Selected)   Outpatient Orders  Ordered (Dispatched)  Glucose* ([facility]): Specimen Type: Serum, Collection Date: 01/28/20 9:15:00 CST  Hemoglobin A1c* ([facility]): Specimen Type: Blood, Collection Date: 01/28/20 10:06:00 CST  Lipid panel with reflex to direct ldl* ([facility]): Specimen Type: Serum, Collection Date: 01/28/20 9:15:00 CST  Prescriptions  Prescribed  atorvastatin 20 mg oral tablet: = 1 tab(s), Oral, daily, # 90 tab(s), 3 Refill(s), Type: Maintenance, Pharmacy: [place] [id number] [facility], 1 tab(s) Oral  daily  Documented Medications  Documented  aspirin 81 mg oral tablet: 1 tab(s) ( 81 mg ), PO, daily, tab(s), 0 Refill(s), Type: Maintenance.       Patient seen with PA student with discussion of diagnosis/diagnoses and plan of care.

## 2022-02-16 NOTE — TELEPHONE ENCOUNTER
---------------------  From: Alis Mcdaniel CMA   To: Appointment Pool ([id number]);     Sent: 1/13/2021 7:02:41 AM CST  Subject: appt     Pt is due for his annual px and fasting labs.  1 month of meds sent to pharmacy per protocol.  Alis Lopez CMA.LVM x1 for pt to call and scheduleLVMX2 FOR PATIENT TO SCHEDULE PX AND FASTING LABS

## 2022-02-16 NOTE — TELEPHONE ENCOUNTER
---------------------  From: Shalom Velasco MD   To: CHASE ZAMORA    Sent: 4/19/2019 4:19:48 PM CDT  Subject: Patient Message - Results Notification       The mole removed was mildly abnormal.  Recommended treatment is removal and this has been accomplished.  Continue to monitor remaining moles for any changes.

## 2022-03-02 NOTE — LETTER
(Inserted Image. Unable to display)   February 02, 2022  CHASE ZAMORA  [address] [place] [address]        Dear CHASE,    Thank you for selecting [facility] for your healthcare needs.    Our records indicate you are due for the following services:     Annual Wellness Visit    (FYI   Regarding office visits: In some instances, a video visit or telephone visit may be offered as an option.)    To schedule an appointment or if you have further questions, please contact your clinic at [phone].    Powered by [employer]    Sincerely,    Shalom Velasco MD

## 2022-03-02 NOTE — LETTER
(Inserted Image. Unable to display)   February 16, 2022  CHASE ZAMOAR  [address] [place] [address]        Dear CHASE,    Thank you for selecting [facility] for your healthcare needs.    Our records indicate you are due for the following services:     Sleep Apnea Follow up ~     (FYI   Regarding office visits: In some instances, a video visit or telephone visit may be offered as an option.)    To schedule an appointment or if you have further questions, please contact your clinic at [phone].    Powered by [employer]    Sincerely,    Joselo Hernandez MD

## 2022-03-02 NOTE — TELEPHONE ENCOUNTER
Entered by Juliette Monterroso MA on February 08, 2022 9:26:19 AM CST  ---------------------  From: Juliette Monterroso MA   To: [facility] [id number] [facility]    Sent: 2/8/2022 9:26:19 AM CST  Subject: Medication Management     ** Submitted: **  Order:atorvastatin (atorvastatin 20 mg oral tablet)  1 tab(s)  Oral  daily  Qty:  90 tab(s)        Days Supply:  90        Refills:  0          Substitutions Allowed     Route To Pharmacy [facility] [id number] [facility]    Signed by Juliette Monterroso MA  2/8/2022 3:25:00 PM [facility]    ** Submitted: **  Complete:atorvastatin (atorvastatin 20 mg oral tablet)   Signed by Juliette Monterroso MA  2/8/2022 3:26:00 PM [facility]    ** Not Approved:  **  atorvastatin (ATORVASTATIN 20 MG TABLET)  TAKE 1 TABLET BY MOUTH EVERY DAY  Qty:  90 tab(s)        Days Supply:  90        Refills:  3          Substitutions Allowed     Route To Pharmacy - [facility] [id number] [facility]   Signed by Juliette Monterroso MA            ------------------------------------------  From: [facility] [id number] [facility]  To: Shalom Velasco MD  Sent: February 5, 2022 8:26:14 AM CST  Subject: Medication Management  Due: January 21, 2022 11:04:18 AM CST     ** On Hold Pending Signature **     Dispensed Drug: atorvastatin (atorvastatin 20 mg oral tablet), TAKE 1 TABLET BY MOUTH EVERY DAY  Quantity: 90 tab(s)  Days Supply: 90  Refills: 3  Substitutions Allowed  Notes from Pharmacy:  ------------------------------------------

## 2022-05-10 ENCOUNTER — TELEPHONE (OUTPATIENT)
Dept: FAMILY MEDICINE | Facility: CLINIC | Age: 67
End: 2022-05-10

## 2022-05-10 NOTE — TELEPHONE ENCOUNTER
Please call pt to get him set up for AWV and to also get set up for colonoscopy per GTG.  Thanks.

## 2022-05-11 DIAGNOSIS — E78.00 HYPERCHOLESTEREMIA: Primary | ICD-10-CM

## 2022-05-11 RX ORDER — ATORVASTATIN CALCIUM 20 MG/1
20 TABLET, FILM COATED ORAL DAILY
Qty: 30 TABLET | Refills: 0 | Status: SHIPPED | OUTPATIENT
Start: 2022-05-11 | End: 2022-06-15

## 2022-05-11 RX ORDER — ATORVASTATIN CALCIUM 20 MG/1
1 TABLET, FILM COATED ORAL DAILY
COMMUNITY
Start: 2021-02-01 | End: 2022-05-11

## 2022-05-11 NOTE — TELEPHONE ENCOUNTER
Last Written Prescription Date:  2/8/22  Last Fill Quantity: 90,  # refills: 0   Last office visit: 11/18/21, Parotitis  Future Office Visit:  Past due for annual fasting labs and visit/med check        Prescription approved per [facility] Refill Protocol. 30 days.    Needs appointment for more refills.    Ayla Hancock RN

## 2022-05-12 ENCOUNTER — TELEPHONE (OUTPATIENT)
Dept: LAB | Facility: CLINIC | Age: 67
End: 2022-05-12

## 2022-05-12 DIAGNOSIS — I10 PRIMARY HYPERTENSION: ICD-10-CM

## 2022-05-12 DIAGNOSIS — E78.2 MIXED HYPERLIPIDEMIA: Primary | ICD-10-CM

## 2022-05-12 PROBLEM — E66.9 OBESITY: Status: ACTIVE | Noted: 2022-05-12

## 2022-05-12 RX ORDER — TRIAMCINOLONE ACETONIDE 1 MG/G
CREAM TOPICAL
COMMUNITY
Start: 2021-02-01 | End: 2022-06-15

## 2022-05-25 ENCOUNTER — TELEPHONE (OUTPATIENT)
Dept: FAMILY MEDICINE | Facility: CLINIC | Age: 67
End: 2022-05-25
Payer: COMMERCIAL
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
## 2022-05-25 NOTE — TELEPHONE ENCOUNTER
Reason for Call: Request for an order or referral:    Order or referral being requested: Lymes test     Date needed: as soon as possible    Has the patient been seen by the PCP for this problem? NO    Additional comments: Patient calling in today he was out deer hunting and noticed he had a lot of deer ticks on him patient would like to be tested for Lyme's Disease patient has labs scheduled for 06/01/22 would like to have this added on     Phone number Patient can be reached at:  Home number on file [phone] (home)    Best Time:  any    Can we leave a detailed message on this number?  YES    Call taken on 5/25/2022 at 10:57 AM by Elif Santiago

## 2022-06-01 ENCOUNTER — LAB (OUTPATIENT)
Dept: LAB | Facility: CLINIC | Age: 67
End: 2022-06-01
Payer: MEDICARE

## 2022-06-01 DIAGNOSIS — E78.2 MIXED HYPERLIPIDEMIA: ICD-10-CM

## 2022-06-01 DIAGNOSIS — A69.20 LYME DISEASE: Primary | ICD-10-CM
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
## 2022-06-01 DIAGNOSIS — I10 PRIMARY HYPERTENSION: ICD-10-CM

## 2022-06-01 LAB
ANION GAP SERPL CALCULATED.3IONS-SCNC: 1 MMOL/L (ref 3–14)
BUN SERPL-MCNC: 20 MG/DL (ref 7–30)
CALCIUM SERPL-MCNC: 8.9 MG/DL (ref 8.5–10.1)
CHLORIDE BLD-SCNC: 110 MMOL/L (ref 94–109)
CHOLEST SERPL-MCNC: 163 MG/DL
CO2 SERPL-SCNC: 31 MMOL/L (ref 20–32)
CREAT SERPL-MCNC: 1.04 MG/DL (ref 0.66–1.25)
FASTING STATUS PATIENT QL REPORTED: YES
GFR SERPL CREATININE-BSD FRML MDRD: 79 ML/MIN/1.73M2
GLUCOSE BLD-MCNC: 98 MG/DL (ref 70–99)
HDLC SERPL-MCNC: 65 MG/DL
LDLC SERPL CALC-MCNC: 86 MG/DL
NONHDLC SERPL-MCNC: 98 MG/DL
POTASSIUM BLD-SCNC: 5.3 MMOL/L (ref 3.4–5.3)
SODIUM SERPL-SCNC: 142 MMOL/L (ref 133–144)
TRIGL SERPL-MCNC: 60 MG/DL

## 2022-06-01 PROCEDURE — 80048 BASIC METABOLIC PNL TOTAL CA: CPT | Performed by: FAMILY MEDICINE

## 2022-06-01 PROCEDURE — 36415 COLL VENOUS BLD VENIPUNCTURE: CPT | Performed by: FAMILY MEDICINE

## 2022-06-01 PROCEDURE — 86617 LYME DISEASE ANTIBODY: CPT | Performed by: FAMILY MEDICINE

## 2022-06-01 PROCEDURE — 80061 LIPID PANEL: CPT | Performed by: FAMILY MEDICINE

## 2022-06-01 PROCEDURE — 86618 LYME DISEASE ANTIBODY: CPT | Performed by: FAMILY MEDICINE

## 2022-06-02 LAB — B BURGDOR IGG+IGM SER QL: 8.44

## 2022-06-06 LAB
B BURGDOR IGG SERPL QL IA: >45 INDEX
B BURGDOR IGG SERPL QL IA: REACTIVE
B BURGDOR IGM SERPL QL IA: 5.32 INDEX
B BURGDOR IGM SERPL QL IA: REACTIVE

## 2022-06-07 RX ORDER — DOXYCYCLINE 100 MG/1
100 CAPSULE ORAL 2 TIMES DAILY
Qty: 20 CAPSULE | Refills: 0 | Status: SHIPPED | OUTPATIENT
Start: 2022-06-07 | End: 2022-06-17

## 2022-06-09 DIAGNOSIS — E78.00 HYPERCHOLESTEREMIA: ICD-10-CM

## 2022-06-10 ENCOUNTER — TELEPHONE (OUTPATIENT)
Dept: FAMILY MEDICINE | Facility: CLINIC | Age: 67
End: 2022-06-10
Payer: COMMERCIAL

## 2022-06-10 RX ORDER — ATORVASTATIN CALCIUM 20 MG/1
TABLET, FILM COATED ORAL
Qty: 30 TABLET | Refills: 0 | OUTPATIENT
Start: 2022-06-10

## 2022-06-10 NOTE — TELEPHONE ENCOUNTER
Patient called triage (returning call).  Reviewed Results notes with patient and instructions. Patient will  RX.

## 2022-06-10 NOTE — TELEPHONE ENCOUNTER
Next 5 appointments (look out 90 days)    Gaol 15, 2022  8:20 AM  (Arrive by 8:00 AM)  Annual Wellness Visit with Shalom Velasco MD  [facility] ([facility] ) [address] [place] [id number] [phone]

## 2022-06-15 ENCOUNTER — OFFICE VISIT (OUTPATIENT)
Dept: FAMILY MEDICINE | Facility: CLINIC | Age: 67
End: 2022-06-15
Payer: MEDICARE

## 2022-06-15 VITALS
SYSTOLIC BLOOD PRESSURE: 147 MMHG | HEIGHT: 73 IN | BODY MASS INDEX: 30.44 KG/M2 | TEMPERATURE: 97.6 F | DIASTOLIC BLOOD PRESSURE: 91 MMHG | HEART RATE: 76 BPM | WEIGHT: 229.7 LBS

## 2022-06-15 DIAGNOSIS — I10 PRIMARY HYPERTENSION: ICD-10-CM

## 2022-06-15 DIAGNOSIS — E78.00 HYPERCHOLESTEREMIA: ICD-10-CM

## 2022-06-15 DIAGNOSIS — Z00.00 ENCOUNTER FOR MEDICARE ANNUAL WELLNESS EXAM: Primary | ICD-10-CM

## 2022-06-15 DIAGNOSIS — Z12.11 COLON CANCER SCREENING: ICD-10-CM

## 2022-06-15 DIAGNOSIS — G47.33 OBSTRUCTIVE SLEEP APNEA SYNDROME: ICD-10-CM

## 2022-06-15 PROCEDURE — 99214 OFFICE O/P EST MOD 30 MIN: CPT | Mod: 25 | Performed by: FAMILY MEDICINE

## 2022-06-15 PROCEDURE — G0439 PPPS, SUBSEQ VISIT: HCPCS | Performed by: FAMILY MEDICINE

## 2022-06-15 RX ORDER — ATORVASTATIN CALCIUM 20 MG/1
20 TABLET, FILM COATED ORAL DAILY
Qty: 90 TABLET | Refills: 3 | Status: SHIPPED | OUTPATIENT
Start: 2022-06-15 | End: 2023-06-09

## 2022-06-15 ASSESSMENT — ENCOUNTER SYMPTOMS
NAUSEA: 0
FREQUENCY: 0
HEMATURIA: 0
ARTHRALGIAS: 1
PALPITATIONS: 0
CHILLS: 0
EYE PAIN: 0
DYSURIA: 0
WEAKNESS: 0
SHORTNESS OF BREATH: 0
NERVOUS/ANXIOUS: 0
FEVER: 0
DIZZINESS: 0
HEADACHES: 0
ABDOMINAL PAIN: 0
PARESTHESIAS: 0
JOINT SWELLING: 0
HEARTBURN: 0
SORE THROAT: 0
COUGH: 0
CONSTIPATION: 0
DIARRHEA: 0
HEMATOCHEZIA: 0
MYALGIAS: 1

## 2022-06-15 ASSESSMENT — ACTIVITIES OF DAILY LIVING (ADL): CURRENT_FUNCTION: NO ASSISTANCE NEEDED

## 2022-06-15 NOTE — PATIENT INSTRUCTIONS
Patient Education   Personalized Prevention Plan  You are due for the preventive services outlined below.  Your care team is available to assist you in scheduling these services.  If you have already completed any of these items, please share that information with your care team to update in your medical record.  Health Maintenance Due   Topic Date Due     ANNUAL REVIEW OF HM ORDERS  Never done     Hepatitis C Screening  Never done     AORTIC ANEURYSM SCREENING (SYSTEM ASSIGNED)  Never done     Zoster (Shingles) Vaccine (2 of 2) 12/16/2020     Colorectal Cancer Screening  03/19/2022     COVID-19 Vaccine (4 - Booster for Moderna series) 06/15/2022       Signs of Hearing Loss      Hearing much better with one ear can be a sign of hearing loss.   Hearing loss is a problem shared by many people. In fact, it is one of the most common health problems, particularly as people age. Most people age 65 and older have some hearing loss. By age 80, almost everyone does. Hearing loss often occurs slowly over the years. So you may not realize your hearing has gotten worse.  Have your hearing checked  Call your healthcare provider if you:    Have to strain to hear normal conversation    Have to watch other people s faces very carefully to follow what they re saying    Need to ask people to repeat what they ve said    Often misunderstand what people are saying    Turn the volume of the television or radio up so high that others complain    Feel that people are mumbling when they re talking to you    Find that the effort to hear leaves you feeling tired and irritated    Notice, when using the phone, that you hear better with one ear than the other  [employer] last reviewed this educational content on 1/1/2020 2000-2021 [employer]. All rights reserved. This information is not intended as a substitute for professional medical care. Always follow your healthcare professional's instructions.

## 2022-06-15 NOTE — LETTER
July 6, 2022      Brendan Loo  [address] [place] [address]        Dear ,    We are writing to inform you of your test results.    Your Cologuard test was negative.  Please repeat in 3 years.    Resulted Orders   COLOGUARD(EXACT SCIENCES)   Result Value Ref Range    COLOGUARD-ABSTRACT Negative Negative      Comment:        NEGATIVE TEST RESULT. A negative Cologuard result indicates a low likelihood that a colorectal cancer (CRC) or advanced adenoma (adenomatous polyps with more advanced pre-malignant features)  is present. The chance that a person with a negative Cologuard test has a colorectal cancer is less than 1 in 1500 (negative predictive value >99.9%) or has an  advanced adenoma is less than  5.3% (negative predictive value 94.7%). These data are based on a prospective cross-sectional study of 10,000 individuals at average risk for colorectal cancer who were screened with both Cologuard and colonoscopy. (Carl MONIQUE. et al, N Engl J Med 2014;370(14):3027-8192) The normal value (reference range) for this assay is negative.    COLOGUARD RE-SCREENING RECOMMENDATION: Periodic colorectal cancer screening is an important part of preventive healthcare for asymptomatic individuals at average risk for colorectal cancer.  Following a negative Cologuard result, the American Cancer Society and U.S.  Multi-Society Task Force screening guidelines recommend a Cologuard re-screening interval of 3 years.   References: American Cancer Society Guideline for Colorectal Cancer Screening: https://www.cancer.org/cancer/colon-rectal-cancer/cfxwtvaqh-irlbcsrfd-cggetce/acs-recommendations.html.; Nicholas KEYS, Jesus ALTAMIRANO, Gelacio MEDINAK, Colorectal Cancer Screening: Recommendations for Physicians and Patients from the U.S. Multi-Society Task Force on Colorectal Cancer Screening , Am J Gastroenterology 2017; 112:8854-3729.    TEST DESCRIPTION: Composite algorithmic analysis of stool DNA-biomarkers with hemoglobin  immunoassay.   Quantitative values of individual biomarkers are not reportable and are not associated with individual biomarker result reference ranges. Cologuard is intended for colorectal cancer screening of adults of either sex, 45 years or older, who are at average-risk for colorectal cancer (CRC). Cologuard has been approved for use by the U.S. FDA. The performance of Cologuard was  established in a cross sectional study of average-risk adults aged 50-84. Cologuard performance in patients ages 45 to 49 years was estimated by sub-group analysis of near-age groups. Colonoscopies performed for a positive result may find as the most clinically significant lesion: colorectal cancer [4.0%], advanced adenoma (including sessile serrated polyps greater than or equal to 1cm diameter) [20%] or non- advanced adenoma [31%]; or no colorectal neoplasia [45%]. These estimates are derived from a prospective cross-sectional screening study of 10,000 individuals at average risk for colorectal cancer who were screened with both Cologuard and colonoscopy. (Carl BARRETT et al, N Engl J Med 2014;370(14):3540-5285.) Cologuard may produce a false negative or false positive result (no colorectal cancer or precancerous polyp present at colonoscopy follow up). A negative Cologuard test result does not guarantee the absence of CRC or advanced adenoma (pre-cancer). The current Cologuard  screening interval is every 3 years. (American Cancer Society and U.S. Multi-Society Task Force). Cologuard performance data in a 10,000 patient pivotal study using colonoscopy as the reference method can be accessed at the following location: www.[employer].Org/results. Additional description of the Cologuard test process, warnings and precautions can be found at www.[email or web address].[email or web address].         If you have any questions or concerns, please call the clinic at the number listed above.       Sincerely,      Shalom Velasco MD

## 2022-06-15 NOTE — PROGRESS NOTES
"SUBJECTIVE:   Brendan Loo is a 66 year old male who presents for Preventive Visit.      Are you in the first 12 months of your Medicare coverage?  No    Healthy Habits:     In general, how would you rate your overall health?  Good    Frequency of exercise:  2-3 days/week    Duration of exercise:  45-60 minutes    Do you usually eat at least 4 servings of fruit and vegetables a day, include whole grains    & fiber and avoid regularly eating high fat or \"junk\" foods?  Yes    Taking medications regularly:  Yes    Medication side effects:  None    Ability to successfully perform activities of daily living:  No assistance needed    Home Safety:  No safety concerns identified    Hearing Impairment:  Difficulty following a conversation in a noisy restaurant or crowded room and difficulty understanding soft or whispered speech    In the past 6 months, have you been bothered by leaking of urine?  No    In general, how would you rate your overall mental or emotional health?  Good      PHQ-2 Total Score: 0    Additional concerns today:  No    Do you feel safe in your environment? Yes    Have you ever done Advance Care Planning? (For example, a Health Directive, POLST, or a discussion with a medical provider or your loved ones about your wishes): Yes, patient states has an Advance Care Planning document and will bring a copy to the clinic.      Right Ear:      1000 Hz RESPONSE- on Level:   20 db  (Conditioning sound)   1000 Hz: RESPONSE- on Level:   20 db    2000 Hz: RESPONSE- on Level:   20 db    4000 Hz: RESPONSE- on Level: tone not heard    Left Ear:      4000 Hz: RESPONSE- on Level: tone not heard   2000 Hz: RESPONSE- on Level:   20 db    1000 Hz: RESPONSE- on Level:   20 db     500 Hz: RESPONSE- on Level:   20 db     Right Ear:    500 Hz: RESPONSE- on Level:   20 db     Hearing Acuity: REFER    Hearing Assessment: abnormal--declines referral   Fall risk  Fallen 2 or more times in the past year?: No  Any fall with injury " in the past year?: No    Cognitive Screening   1) Repeat 3 items (Banana, Sunrise, Chair)    2) Clock draw: NORMAL  3) 3 item recall: Recalls 2 objects   Results: NORMAL clock, 1-2 items recalled: COGNITIVE IMPAIRMENT LESS LIKELY    Mini-CogTM Copyright RAUL Cummins. Licensed by the author for use in [facility]; reprinted with permission (evon@[facility]). All rights reserved.      Do you have sleep apnea, excessive snoring or daytime drowsiness?: yes    Reviewed and updated as needed this visit by clinical staff                    Reviewed and updated as needed this visit by Provider  He reports tolerance of doxycycline for positive                    Social History     Tobacco Use     Smoking status: Not on file     Smokeless tobacco: Not on file   Substance Use Topics     Alcohol use: Not on file     If you drink alcohol do you typically have >3 drinks per day or >7 drinks per week? No    Alcohol Use 6/15/2022   Prescreen: >3 drinks/day or >7 drinks/week? Yes   AUDIT SCORE  5     AUDIT - Alcohol Use Disorders Identification Test - Reproduced from the World Health Organization Audit 2001 (Second Edition) 6/15/2022   1.  How often do you have a drink containing alcohol? 4 or more times a week   2.  How many drinks containing alcohol do you have on a typical day when you are drinking? 1 or 2   3.  How often do you have five or more drinks on one occasion? Less than monthly   4.  How often during the last year have you found that you were not able to stop drinking once you had started? Never   5.  How often during the last year have you failed to do what was normally expected of you because of drinking? Never   6.  How often during the last year have you needed a first drink in the morning to get yourself going after a heavy drinking session? Never   7.  How often during the last year have you had a feeling of guilt or remorse after drinking? Never   8.  How often during the last year have you been unable to  remember what happened the night before because of your drinking? Never   9.  Have you or someone else been injured because of your drinking? No   10. Has a relative, friend, doctor or other health care worker been concerned about your drinking or suggested you cut down? No   TOTAL SCORE 5               Current providers sharing in care for this patient include:   Patient Care Team:  Shalom Velasco MD as PCP - General (Family Medicine)  Shalom Velasco MD as Assigned PCP    The following health maintenance items are reviewed in Epic and correct as of today:  Health Maintenance Due   Topic Date Due     ANNUAL REVIEW OF  ORDERS  Never done     ADVANCE CARE PLANNING  Never done     HEPATITIS C SCREENING  Never done     AORTIC ANEURYSM SCREENING (SYSTEM ASSIGNED)  Never done     ZOSTER IMMUNIZATION (2 of 2) 12/16/2020     MEDICARE ANNUAL WELLNESS VISIT  02/01/2022     COLORECTAL CANCER SCREENING  03/19/2022     COVID-19 Vaccine (4 - Booster for Moderna series) 06/15/2022     Labs reviewed in EPIC          Review of Systems   Constitutional: Negative for chills and fever.   HENT: Negative for congestion, ear pain, hearing loss and sore throat.    Eyes: Negative for pain and visual disturbance.   Respiratory: Negative for cough and shortness of breath.    Cardiovascular: Negative for chest pain, palpitations and peripheral edema.   Gastrointestinal: Negative for abdominal pain, constipation, diarrhea, heartburn, hematochezia and nausea.   Genitourinary: Negative for dysuria, frequency, genital sores, hematuria, impotence, penile discharge and urgency.   Musculoskeletal: Positive for arthralgias and myalgias. Negative for joint swelling.   Skin: Negative for rash.   Neurological: Negative for dizziness, weakness, headaches and paresthesias.   Psychiatric/Behavioral: Negative for mood changes. The patient is not nervous/anxious.          OBJECTIVE:   There were no vitals taken for this visit. Estimated body mass  "index is 30.61 kg/m  as calculated from the following:    Height as of 11/18/21: 1.854 m (6' 1\").    Weight as of 11/18/21: 105.2 kg (232 lb).  Physical Exam  GENERAL: healthy, alert and no distress  EYES: Eyes grossly normal to inspection, PERRL and conjunctivae and sclerae normal  HENT: ear canals and TM's normal, nose and mouth without ulcers or lesions  NECK: no adenopathy, no asymmetry, masses, or scars and thyroid normal to palpation  RESP: lungs clear to auscultation - no rales, rhonchi or wheezes  CV: regular rate and rhythm, normal S1 S2, no S3 or S4, no murmur, click or rub, no peripheral edema and peripheral pulses strong  ABDOMEN: soft, nontender, no hepatosplenomegaly, no masses and bowel sounds normal  MS: no gross musculoskeletal defects noted, no edema  SKIN: no suspicious lesions or rashes  NEURO: Normal strength and tone, mentation intact and speech normal  PSYCH: mentation appears normal, affect normal/bright    Diagnostic Test Results:  Labs reviewed in Epic    ASSESSMENT / PLAN:   (Z00.00) Encounter for Medicare annual wellness exam  (primary encounter diagnosis)  Comment: Patient expected  Plan: Marly health maintenance, healthy diet, regular exercise    (G47.33) Obstructive sleep apnea syndrome  Comment: Controlled, continue with CPAP    (I10) Primary hypertension  Comment: Blood pressure elevated today  Plan: He will monitor his blood pressure at home over the next couple weeks and send us the numbers.  If not at goal losartan    (Z12.11) Colon cancer screening  Plan: DARRYL(EXACT SCIENCES)    (E78.00) Hypercholesteremia  Comment: Controlled, tolerating statin  Plan: atorvastatin (LIPITOR) 20 MG tablet              Patient has been advised of split billing requirements and indicates understanding: Yes    COUNSELING:  Reviewed preventive health counseling, as reflected in patient instructions       Regular exercise       Healthy diet/nutrition       Vision screening       Hearing screening   " "    Dental care       Bladder control       Fall risk prevention       Colon cancer screening       Prostate cancer screening    Estimated body mass index is 30.61 kg/m  as calculated from the following:    Height as of 11/18/21: 1.854 m (6' 1\").    Weight as of 11/18/21: 105.2 kg (232 lb).        He has no history on file for tobacco use.      Appropriate preventive services were discussed with this patient, including applicable screening as appropriate for cardiovascular disease, diabetes, osteopenia/osteoporosis, and glaucoma.  As appropriate for age/gender, discussed screening for colorectal cancer, prostate cancer, breast cancer, and cervical cancer. Checklist reviewing preventive services available has been given to the patient.    Reviewed patients plan of care and provided an AVS. The Basic Care Plan (routine screening as documented in Health Maintenance) for Brendan meets the Care Plan requirement. This Care Plan has been established and reviewed with the Patient.    Counseling Resources:  ATP IV Guidelines  Pooled Cohorts Equation Calculator  Breast Cancer Risk Calculator  Breast Cancer: Medication to Reduce Risk  FRAX Risk Assessment  ICSI Preventive Guidelines  Dietary Guidelines for Americans, 2010  USDA's MyPlate  ASA Prophylaxis  Lung CA Screening    Shalom Velasco MD  [employer]    Identified Health Risks:    The patient was provided with written information regarding signs of hearing loss.  "

## 2022-06-16 NOTE — PROCEDURES
Accession Number:       [id number]  PATHOLOGIST:     See comment       Gio Brooks M.D., Dermatopathologist,       Board Certified in Dermatopathology, Anatomic       Pathology and Clinical Pathology       2   (electronic signature)  A SOURCE:     Skin, lower mid back, punch biopsy  A GROSS DESCRIPTION:     See comment       Specimen is received in formalin, labeled with       multiple patient identifier(s) and consists of       one piece from a punch skin biopsy measuring 0.3       x 0.3 x 0.2 cm, circular in shape and tan-gray in       color. The margins are inked green. The specimen       is bisected and entirely submitted in one       cassette(s).         Gross exam(s) performed at: [address] [address]         : BIANKA LAWRENCE MD  A DIAGNOSIS:     See comment         Dysplastic lentiginous junctional melanocytic       nevus with moderate architectural and mild       cytologic atypia, irritated and pigmented, punch       margin appears negative.  A COMMENT:     See comment         Melanocytic marker shows no significant pagetoid       dispersion (all positive and required negative       controls stained appropriately).         Clinical correlation/follow-up is recommended.

## 2022-07-03 LAB — NONINV COLON CA DNA+OCC BLD SCRN STL QL: NEGATIVE

## 2022-11-13 ENCOUNTER — MEDICAL CORRESPONDENCE (OUTPATIENT)
Dept: HEALTH INFORMATION MANAGEMENT | Facility: CLINIC | Age: 67
End: 2022-11-13

## 2023-03-01 ENCOUNTER — TELEPHONE (OUTPATIENT)
Dept: FAMILY MEDICINE | Facility: CLINIC | Age: 68
End: 2023-03-01
Payer: COMMERCIAL

## 2023-03-01 DIAGNOSIS — H10.9 CONJUNCTIVITIS OF BOTH EYES, UNSPECIFIED CONJUNCTIVITIS TYPE: Primary | ICD-10-CM

## 2023-03-01 RX ORDER — TOBRAMYCIN 3 MG/ML
1-2 SOLUTION/ DROPS OPHTHALMIC
Qty: 5 ML | Refills: 0 | Status: SHIPPED | OUTPATIENT
Start: 2023-03-01 | End: 2023-06-21

## 2023-03-01 NOTE — TELEPHONE ENCOUNTER
Call with pt, informed prescription has been sent and to follow up in clinic if sxs are not improving.

## 2023-03-01 NOTE — TELEPHONE ENCOUNTER
Patient was wondering if Dr. Velasco would send over an eye drop for his pink eye.    Please tk to discuss- [phone]

## 2023-03-01 NOTE — TELEPHONE ENCOUNTER
CSS called pt.   Is complaining of bilateral eye redness, mattery/crusty in AM, itchy for the past couple of days.  Also has nasal congestion.  Uses [facility].  Please advise.

## 2023-05-16 ENCOUNTER — PATIENT OUTREACH (OUTPATIENT)
Dept: CARE COORDINATION | Facility: CLINIC | Age: 68
End: 2023-05-16
Payer: COMMERCIAL

## 2023-05-31 ENCOUNTER — PATIENT OUTREACH (OUTPATIENT)
Dept: CARE COORDINATION | Facility: CLINIC | Age: 68
End: 2023-05-31
Payer: COMMERCIAL

## 2023-06-07 DIAGNOSIS — E78.00 HYPERCHOLESTEREMIA: ICD-10-CM

## 2023-06-08 NOTE — TELEPHONE ENCOUNTER
Routing to provider as refill request for review/approval because:      LOV 6/15/22  Future visit: 6/21/23    Sarita Zaragoza RN  [facility]

## 2023-06-09 RX ORDER — ATORVASTATIN CALCIUM 20 MG/1
TABLET, FILM COATED ORAL
Qty: 90 TABLET | Refills: 3 | Status: SHIPPED | OUTPATIENT
Start: 2023-06-09 | End: 2024-06-07

## 2023-06-18 ASSESSMENT — ENCOUNTER SYMPTOMS
HEMATURIA: 0
SHORTNESS OF BREATH: 0
FEVER: 0
NAUSEA: 0
CHILLS: 0
DIARRHEA: 0
HEMATOCHEZIA: 0
CONSTIPATION: 0
COUGH: 0
DIZZINESS: 0
HEARTBURN: 0
SORE THROAT: 0
HEADACHES: 0
FREQUENCY: 0
PARESTHESIAS: 0
PALPITATIONS: 0
DYSURIA: 0
JOINT SWELLING: 0
WEAKNESS: 0
EYE PAIN: 0
ARTHRALGIAS: 1
NERVOUS/ANXIOUS: 0
MYALGIAS: 1
ABDOMINAL PAIN: 0

## 2023-06-18 ASSESSMENT — ACTIVITIES OF DAILY LIVING (ADL): CURRENT_FUNCTION: NO ASSISTANCE NEEDED

## 2023-06-21 ENCOUNTER — OFFICE VISIT (OUTPATIENT)
Dept: FAMILY MEDICINE | Facility: CLINIC | Age: 68
End: 2023-06-21
Payer: MEDICARE

## 2023-06-21 VITALS
WEIGHT: 223.2 LBS | TEMPERATURE: 97.6 F | BODY MASS INDEX: 29.58 KG/M2 | OXYGEN SATURATION: 98 % | HEART RATE: 79 BPM | SYSTOLIC BLOOD PRESSURE: 139 MMHG | HEIGHT: 73 IN | DIASTOLIC BLOOD PRESSURE: 86 MMHG | RESPIRATION RATE: 20 BRPM

## 2023-06-21 DIAGNOSIS — Z00.00 ENCOUNTER FOR MEDICARE ANNUAL WELLNESS EXAM: Primary | ICD-10-CM

## 2023-06-21 DIAGNOSIS — E78.00 HYPERCHOLESTEREMIA: ICD-10-CM

## 2023-06-21 DIAGNOSIS — I10 PRIMARY HYPERTENSION: ICD-10-CM

## 2023-06-21 LAB
ANION GAP SERPL CALCULATED.3IONS-SCNC: 10 MMOL/L (ref 7–15)
BUN SERPL-MCNC: 11 MG/DL (ref 8–23)
CALCIUM SERPL-MCNC: 9.5 MG/DL (ref 8.8–10.2)
CHLORIDE SERPL-SCNC: 104 MMOL/L (ref 98–107)
CHOLEST SERPL-MCNC: 174 MG/DL
CREAT SERPL-MCNC: 1 MG/DL (ref 0.67–1.17)
DEPRECATED HCO3 PLAS-SCNC: 24 MMOL/L (ref 22–29)
GFR SERPL CREATININE-BSD FRML MDRD: 82 ML/MIN/1.73M2
GLUCOSE SERPL-MCNC: 98 MG/DL (ref 70–99)
HDLC SERPL-MCNC: 72 MG/DL
LDLC SERPL CALC-MCNC: 88 MG/DL
NONHDLC SERPL-MCNC: 102 MG/DL
POTASSIUM SERPL-SCNC: 4.8 MMOL/L (ref 3.4–5.3)
SODIUM SERPL-SCNC: 138 MMOL/L (ref 136–145)
TRIGL SERPL-MCNC: 72 MG/DL

## 2023-06-21 PROCEDURE — 80048 BASIC METABOLIC PNL TOTAL CA: CPT | Performed by: FAMILY MEDICINE

## 2023-06-21 PROCEDURE — 99214 OFFICE O/P EST MOD 30 MIN: CPT | Mod: 25 | Performed by: FAMILY MEDICINE

## 2023-06-21 PROCEDURE — 80061 LIPID PANEL: CPT | Performed by: FAMILY MEDICINE

## 2023-06-21 PROCEDURE — G0439 PPPS, SUBSEQ VISIT: HCPCS | Performed by: FAMILY MEDICINE

## 2023-06-21 PROCEDURE — 36415 COLL VENOUS BLD VENIPUNCTURE: CPT | Performed by: FAMILY MEDICINE

## 2023-06-21 RX ORDER — LOSARTAN POTASSIUM 50 MG/1
50 TABLET ORAL DAILY
Qty: 90 TABLET | Refills: 1 | Status: SHIPPED | OUTPATIENT
Start: 2023-06-21 | End: 2023-12-13

## 2023-06-21 ASSESSMENT — ENCOUNTER SYMPTOMS
EYE PAIN: 0
SORE THROAT: 0
FREQUENCY: 0
HEMATOCHEZIA: 0
COUGH: 0
MYALGIAS: 1
ARTHRALGIAS: 1
HEADACHES: 0
WEAKNESS: 0
ABDOMINAL PAIN: 0
JOINT SWELLING: 0
CONSTIPATION: 0
SHORTNESS OF BREATH: 0
HEARTBURN: 0
CHILLS: 0
HEMATURIA: 0
NERVOUS/ANXIOUS: 0
NAUSEA: 0
PARESTHESIAS: 0
PALPITATIONS: 0
FEVER: 0
DIARRHEA: 0
DIZZINESS: 0
DYSURIA: 0

## 2023-06-21 ASSESSMENT — ACTIVITIES OF DAILY LIVING (ADL): CURRENT_FUNCTION: NO ASSISTANCE NEEDED

## 2023-06-21 NOTE — PROGRESS NOTES
"SUBJECTIVE:   Brendan is a 67 year old who presents for Preventive Visit.      6/21/2023     9:23 AM   Additional Questions   Roomed by Juliette BREWER CMA   Accompanied by Self         6/21/2023     9:23 AM   Patient Reported Additional Medications   Patient reports taking the following new medications None     Are you in the first 12 months of your Medicare coverage?  No    Healthy Habits:     In general, how would you rate your overall health?  Good    Frequency of exercise:  2-3 days/week    Duration of exercise:  Greater than 60 minutes    Do you usually eat at least 4 servings of fruit and vegetables a day, include whole grains    & fiber and avoid regularly eating high fat or \"junk\" foods?  Yes    Taking medications regularly:  Yes    Medication side effects:  None    Ability to successfully perform activities of daily living:  No assistance needed    Home Safety:  No safety concerns identified    Hearing Impairment:  Difficulty following a conversation in a noisy restaurant or crowded room    In the past 6 months, have you been bothered by leaking of urine?  No    In general, how would you rate your overall mental or emotional health?  Good      PHQ-2 Total Score: 0    Additional concerns today:  No      Have you ever done Advance Care Planning? (For example, a Health Directive, POLST, or a discussion with a medical provider or your loved ones about your wishes): Yes, patient states has an Advance Care Planning document and will bring a copy to the clinic.      Right Ear:      1000 Hz RESPONSE- on Level:   20 db  (Conditioning sound)   1000 Hz: RESPONSE- on Level:   20 db    2000 Hz: RESPONSE- on Level:   20 db    4000 Hz: RESPONSE- on Level: tone not heard    Left Ear:      4000 Hz: RESPONSE- on Level: tone not heard   2000 Hz: RESPONSE- on Level:   20 db    1000 Hz: RESPONSE- on Level:   20 db     500 Hz: RESPONSE- on Level:   20 db     Right Ear:    500 Hz: RESPONSE- on Level:   20 db     Hearing Acuity: " REFER    Hearing Assessment: abnormal--   Fall risk  Fallen 2 or more times in the past year?: No  Any fall with injury in the past year?: No    Cognitive Screening   1) Repeat 3 items (Leader, Season, Table)    2) Clock draw: NORMAL  3) 3 item recall: Recalls 3 objects  Results: 3 items recalled: COGNITIVE IMPAIRMENT LESS LIKELY    Mini-CogTM Copyright RAUL Cummins. Licensed by the author for use in [facility]; reprinted with permission (evon@[facility]). All rights reserved.          Reviewed and updated as needed this visit by clinical staff   Tobacco  Allergies  Meds              Reviewed and updated as needed this visit by Provider                 Social History     Tobacco Use     Smoking status: Former     Types: Dip, chew, snus or snuff     Smokeless tobacco: Former     Types: Chew     Quit date: 6/20/1997   Substance Use Topics     Alcohol use: Yes             6/18/2023    12:06 PM   Alcohol Use   Prescreen: >3 drinks/day or >7 drinks/week? Yes   AUDIT SCORE  5         6/18/2023    12:06 PM   AUDIT - Alcohol Use Disorders Identification Test - Reproduced from the World Health Organization Audit 2001 (Second Edition)   1.  How often do you have a drink containing alcohol? 4 or more times a week   2.  How many drinks containing alcohol do you have on a typical day when you are drinking? 1 or 2   3.  How often do you have five or more drinks on one occasion? Less than monthly   4.  How often during the last year have you found that you were not able to stop drinking once you had started? Never   5.  How often during the last year have you failed to do what was normally expected of you because of drinking? Never   6.  How often during the last year have you needed a first drink in the morning to get yourself going after a heavy drinking session? Never   7.  How often during the last year have you had a feeling of guilt or remorse after drinking? Never   8.  How often during the last year have you been  unable to remember what happened the night before because of your drinking? Never   9.  Have you or someone else been injured because of your drinking? No   10. Has a relative, friend, doctor or other health care worker been concerned about your drinking or suggested you cut down? No   TOTAL SCORE 5     Do you have a current opioid prescription? No  Do you use any other controlled substances or medications that are not prescribed by a provider? None        Current providers sharing in care for this patient include:   Patient Care Team:  Shalom Velasco MD as PCP - General (Family Medicine)  Shalom Velasco MD as Assigned PCP    The following health maintenance items are reviewed in Epic and correct as of today:  Health Maintenance   Topic Date Due     HEPATITIS C SCREENING  Never done     LUNG CANCER SCREENING  Never done     ZOSTER IMMUNIZATION (2 of 2) 12/16/2020     COVID-19 Vaccine (4 - Moderna series) 04/12/2022     DTAP/TDAP/TD IMMUNIZATION (2 - Td or Tdap) 03/25/2023     LIPID  06/01/2023     ANNUAL REVIEW OF HM ORDERS  06/15/2023     BMP  06/20/2023     MEDICARE ANNUAL WELLNESS VISIT  06/15/2023     INFLUENZA VACCINE (Season Ended) 09/01/2023     FALL RISK ASSESSMENT  06/21/2024     COLORECTAL CANCER SCREENING  06/23/2025     ADVANCE CARE PLANNING  06/15/2027     PHQ-2 (once per calendar year)  Completed     Pneumococcal Vaccine: 65+ Years  Completed     IPV IMMUNIZATION  Aged Out     MENINGITIS IMMUNIZATION  Aged Out     AORTIC ANEURYSM SCREENING (SYSTEM ASSIGNED)  Discontinued     Lab work is in process  Labs reviewed in EPIC          Review of Systems   Constitutional: Negative for chills and fever.   HENT: Negative for congestion, ear pain, hearing loss and sore throat.    Eyes: Negative for pain and visual disturbance.   Respiratory: Negative for cough and shortness of breath.    Cardiovascular: Negative for chest pain, palpitations and peripheral edema.   Gastrointestinal: Negative for  "abdominal pain, constipation, diarrhea, heartburn, hematochezia and nausea.   Genitourinary: Negative for dysuria, frequency, genital sores, hematuria, impotence, penile discharge and urgency.   Musculoskeletal: Positive for arthralgias and myalgias. Negative for joint swelling.   Skin: Negative for rash.   Neurological: Negative for dizziness, weakness, headaches and paresthesias.   Psychiatric/Behavioral: Negative for mood changes. The patient is not nervous/anxious.          OBJECTIVE:   BP (!) 162/83 (BP Location: Right arm, Patient Position: Sitting, Cuff Size: Adult Large)   Pulse 94   Temp 97.6  F (36.4  C) (Tympanic)   Resp 20   Ht 1.854 m (6' 1\")   Wt 101.2 kg (223 lb 3.2 oz)   SpO2 98%   BMI 29.45 kg/m   Estimated body mass index is 29.45 kg/m  as calculated from the following:    Height as of this encounter: 1.854 m (6' 1\").    Weight as of this encounter: 101.2 kg (223 lb 3.2 oz).  Physical Exam  GENERAL: healthy, alert and no distress  EYES: Eyes grossly normal to inspection, PERRL and conjunctivae and sclerae normal  HENT: ear canals and TM's normal, nose and mouth without ulcers or lesions  NECK: no adenopathy, no asymmetry, masses, or scars and thyroid normal to palpation  RESP: lungs clear to auscultation - no rales, rhonchi or wheezes  CV: regular rate and rhythm, normal S1 S2, no S3 or S4, no murmur, click or rub, no peripheral edema and peripheral pulses strong  ABDOMEN: soft, nontender, no hepatosplenomegaly, no masses and bowel sounds normal  MS: no gross musculoskeletal defects noted, no edema  SKIN: no suspicious lesions or rashes  NEURO: Normal strength and tone, mentation intact and speech normal  PSYCH: mentation appears normal, affect normal/bright    Diagnostic Test Results:  Labs reviewed in Epic    ASSESSMENT / PLAN:   (Z00.00) Encounter for Medicare annual wellness exam  (primary encounter diagnosis)  Comment: Discussed health maintenance, appropriate diet, appropriate activity " level.  Plan: PRIMARY CARE FOLLOW-UP SCHEDULING            (E78.00) Hypercholesteremia  Comment: Controlled, continue current medication  Plan: Lipid panel reflex to direct LDL Non-fasting    (I10) Primary hypertension  Comment: Blood pressure is elevated.  Plan: BASIC METABOLIC PANEL, losartan (COZAAR) 50 MG         tablet        Labs today, start losartan 50 mg daily, home blood pressure monitoring advised with report of numbers in a month.  Adjust medication as needed at that time.      Patient has been advised of split billing requirements and indicates understanding: Yes      COUNSELING:  Reviewed preventive health counseling, as reflected in patient instructions       Regular exercise       Healthy diet/nutrition       Vision screening       Hearing screening       Dental care       Bladder control       Fall risk prevention       Hepatitis C screening       Colon cancer screening       Prostate cancer screening        He reports that he has quit smoking. His smoking use included dip, chew, snus or snuff. He quit smokeless tobacco use about 26 years ago.  His smokeless tobacco use included chew.      Appropriate preventive services were discussed with this patient, including applicable screening as appropriate for cardiovascular disease, diabetes, osteopenia/osteoporosis, and glaucoma.  As appropriate for age/gender, discussed screening for colorectal cancer, prostate cancer, breast cancer, and cervical cancer. Checklist reviewing preventive services available has been given to the patient.    Reviewed patients plan of care and provided an AVS. The Basic Care Plan (routine screening as documented in Health Maintenance) for Brendan meets the Care Plan requirement. This Care Plan has been established and reviewed with the Patient.          Shalom Velasco MD  [facility]    Identified Health Risks:    I have reviewed Opioid Use Disorder and Substance Use Disorder risk factors and made  any needed referrals.

## 2023-06-21 NOTE — PATIENT INSTRUCTIONS
Patient Education   Personalized Prevention Plan  You are due for the preventive services outlined below.  Your care team is available to assist you in scheduling these services.  If you have already completed any of these items, please share that information with your care team to update in your medical record.  Health Maintenance Due   Topic Date Due     Hepatitis C Screening  Never done     LUNG CANCER SCREENING  Never done     Zoster (Shingles) Vaccine (2 of 2) 12/16/2020     COVID-19 Vaccine (4 - Moderna series) 04/12/2022     Diptheria Tetanus Pertussis (DTAP/TDAP/TD) Vaccine (2 - Td or Tdap) 03/25/2023     Cholesterol Lab  06/01/2023     ANNUAL REVIEW OF HM ORDERS  06/15/2023     Basic Metabolic Panel  06/20/2023       Signs of Hearing Loss  Hearing loss is a problem shared by many people. In fact, it's one of the most common health problems, particularly as people age. Most people aged 65 and older have some hearing loss. By age 80, almost everyone does. Hearing loss often occurs slowly over the years. So, you may not realize your hearing has gotten worse.   When sudden hearing loss occurs, it's important to contact your healthcare provider right away. Your provider will do a medical exam and a hearing exam as soon as possible. This is to help find the cause and type of your sudden hearing loss. Based on your diagnosis, your healthcare provider will discuss possible treatments.      Hearing much better with one ear can be a sign of hearing loss.     Have your hearing checked  Call your healthcare provider if you:     Have to strain to hear normal conversation    Have to watch other people s faces very carefully to follow what they re saying    Need to ask people to repeat what they ve said    Often misunderstand what people are saying    Turn the volume of the television or radio up so high that others complain    Feel that people are mumbling when they re talking to you    Find that the effort to hear  leaves you feeling tired and irritated    Notice, when using the phone, that you hear better with one ear than the other  [email or web address] last reviewed this educational content on 6/1/2022 2000-2022 [employer]. All rights reserved. This information is not intended as a substitute for professional medical care. Always follow your healthcare professional's instructions.

## 2023-06-21 NOTE — PROGRESS NOTES
"    The patient was provided with written information regarding signs of hearing loss.  Answers for HPI/ROS submitted by the patient on 6/18/2023  In general, how would you rate your overall physical health?: good  Frequency of exercise:: 2-3 days/week  Do you usually eat at least 4 servings of fruit and vegetables a day, include whole grains & fiber, and avoid regularly eating high fat or \"junk\" foods? : Yes  Taking medications regularly:: Yes  Medication side effects:: None  Activities of Daily Living: no assistance needed  Home safety: no safety concerns identified  Hearing Impairment:: difficulty following a conversation in a noisy restaurant or crowded room  In the past 6 months, have you been bothered by leaking of urine?: No  abdominal pain: No  Blood in stool: No  Blood in urine: No  chest pain: No  chills: No  congestion: No  constipation: No  cough: No  diarrhea: No  dizziness: No  ear pain: No  eye pain: No  nervous/anxious: No  fever: No  frequency: No  genital sores: No  headaches: No  hearing loss: No  heartburn: No  arthralgias: Yes  joint swelling: No  peripheral edema: No  mood changes: No  myalgias: Yes  nausea: No  dysuria: No  palpitations: No  Skin sensation changes: No  sore throat: No  urgency: No  rash: No  shortness of breath: No  visual disturbance: No  weakness: No  impotence: No  penile discharge: No  In general, how would you rate your overall mental or emotional health?: good  Additional concerns today:: No  Duration of exercise:: Greater than 60 minutes        "

## 2023-12-13 DIAGNOSIS — I10 PRIMARY HYPERTENSION: ICD-10-CM

## 2023-12-13 RX ORDER — LOSARTAN POTASSIUM 50 MG/1
50 TABLET ORAL DAILY
Qty: 90 TABLET | Refills: 0 | Status: SHIPPED | OUTPATIENT
Start: 2023-12-13 | End: 2024-03-11

## 2024-01-09 ENCOUNTER — TELEPHONE (OUTPATIENT)
Dept: FAMILY MEDICINE | Facility: CLINIC | Age: 69
End: 2024-01-09
Payer: MEDICARE

## 2024-01-09 NOTE — TELEPHONE ENCOUNTER
"01/09/24  General Call      Reason for Call:  CPAP Error message    What are your questions or concerns:  Pt calling saying that his CPAP machine came up with a message saying \"Motor life exceeded, contact provider\"    Date of last appointment with provider: 06/21/23    Could we send this information to you in [employer] or would you prefer to receive a phone call?:   Patient would prefer a phone call   Okay to leave a detailed message?: Yes at Cell number on file:    Telephone Information:   Mobile [phone]      "

## 2024-01-24 ENCOUNTER — OFFICE VISIT (OUTPATIENT)
Dept: FAMILY MEDICINE | Facility: CLINIC | Age: 69
End: 2024-01-24
Payer: MEDICARE

## 2024-01-24 VITALS
TEMPERATURE: 97.3 F | BODY MASS INDEX: 30.17 KG/M2 | HEART RATE: 79 BPM | WEIGHT: 227.6 LBS | HEIGHT: 73 IN | DIASTOLIC BLOOD PRESSURE: 82 MMHG | SYSTOLIC BLOOD PRESSURE: 137 MMHG | RESPIRATION RATE: 20 BRPM | OXYGEN SATURATION: 99 %

## 2024-01-24 DIAGNOSIS — G47.33 OBSTRUCTIVE SLEEP APNEA (ADULT) (PEDIATRIC): Primary | ICD-10-CM

## 2024-01-24 DIAGNOSIS — G47.33 OBSTRUCTIVE SLEEP APNEA SYNDROME: Primary | ICD-10-CM

## 2024-01-24 PROCEDURE — 99213 OFFICE O/P EST LOW 20 MIN: CPT | Performed by: FAMILY MEDICINE

## 2024-01-24 NOTE — PROGRESS NOTES
"  Assessment & Plan     Obstructive sleep apnea syndrome  Patient has been compliant with use.  Order has been written for a new machine.  - CPAP Order for DME - ONLY FOR DME            BMI  Estimated body mass index is 30.03 kg/m  as calculated from the following:    Height as of this encounter: 1.854 m (6' 1\").    Weight as of this encounter: 103.2 kg (227 lb 9.6 oz).           Subjective   Brendan is a 68 year old, presenting for the following health issues:  Sleep Problem (Needs new CPAP machine)        1/24/2024    10:01 AM   Additional Questions   Roomed by Juliette BREWER CMA   Accompanied by Self     History of Present Illness       Reason for visit:  My CPAP machine requires replacement.He consumes 1 sweetened beverage(s) daily.He exercises with enough effort to increase his heart rate 10 to 19 minutes per day.  He exercises with enough effort to increase his heart rate 3 or less days per week.   He is taking medications regularly.     Patient is here for an update on his CPAP.  He uses this very regularly.  His current machine has a warning that the motor may cease working in the near future.              Objective    /82 (BP Location: Right arm, Patient Position: Sitting, Cuff Size: Adult Large)   Pulse 79   Temp 97.3  F (36.3  C) (Tympanic)   Resp 20   Ht 1.854 m (6' 1\")   Wt 103.2 kg (227 lb 9.6 oz)   SpO2 99%   BMI 30.03 kg/m    Body mass index is 30.03 kg/m .  Physical Exam   General:  Alert and oriented, No acute distress.    Eye: Normal conjunctiva.     HENT:  Oral mucosa is moist.     Neck:  Supple.     Respiratory:  Respirations are non-labored.     Cardiovascular:  Normal rate   Musculoskeletal:  Normal gait.     Psychiatric:  Cooperative, Appropriate mood & affect, Normal judgment.               Signed Electronically by: Shalom Velasco MD    "

## 2024-02-08 ENCOUNTER — DOCUMENTATION ONLY (OUTPATIENT)
Dept: FAMILY MEDICINE | Facility: CLINIC | Age: 69
End: 2024-02-08
Payer: MEDICARE

## 2024-02-08 NOTE — PROGRESS NOTES
Patient was offered choice of vendor and chose [facility].  Patient Brendan Loo was set up at [place] on February 8, 2024. Patient received a Resmed Airsense 10 Pressures were set at  8 cm H2O.   Patient s ramp is 5 cm H2O for Auto and FLEX/EPR is EPR, 2.  Patient is using the [employer] Respironics Mask name: DreamWear Nasal mask size Medium, heated tubing and heated humidifier.  Patient has the following compliance requirements: using and visit requirements.  Patient will make a follow up with Dr. Velasco.    MILLI KIMBROUGH

## 2024-03-11 DIAGNOSIS — I10 PRIMARY HYPERTENSION: ICD-10-CM

## 2024-03-11 RX ORDER — LOSARTAN POTASSIUM 50 MG/1
50 TABLET ORAL DAILY
Qty: 90 TABLET | Refills: 2 | Status: SHIPPED | OUTPATIENT
Start: 2024-03-11

## 2024-03-14 ENCOUNTER — VIRTUAL VISIT (OUTPATIENT)
Dept: FAMILY MEDICINE | Facility: CLINIC | Age: 69
End: 2024-03-14
Payer: COMMERCIAL

## 2024-03-14 DIAGNOSIS — G47.33 OBSTRUCTIVE SLEEP APNEA SYNDROME: Primary | ICD-10-CM

## 2024-03-14 PROCEDURE — 99213 OFFICE O/P EST LOW 20 MIN: CPT | Mod: 95 | Performed by: FAMILY MEDICINE

## 2024-03-14 NOTE — PROGRESS NOTES
"Brendan is a 68 year old who is being evaluated via a billable video visit.    How would you like to obtain your AVS? MyChart  If the video visit is dropped, the invitation should be resent by: Text to cell phone: [phone]  Will anyone else be joining your video visit? No      Assessment & Plan     Obstructive sleep apnea syndrome  He has been compliant with use.  Average use 8 hours and 22 minutes.  He continues to benefit from CPAP.    ResMed   CPAP 8.0 cmH2O 30 day usage data:  100% of days with > 4 hours of use. 0/30 days with no use.   Average use 504 minutes per day.   95%ile Leak 11.3 L/min.   AHI 1.31 events per hour.                 BMI  Estimated body mass index is 30.03 kg/m  as calculated from the following:    Height as of 1/24/24: 1.854 m (6' 1\").    Weight as of 1/24/24: 103.2 kg (227 lb 9.6 oz).             Subjective   Brendan is a 68 year old, presenting for the following health issues:  Follow Up (Follow up CPAP usage per Medicare guidelines.   Recently received new machine about 6 weeks ago and is working well.  Compliance report printed from [employer] website for review. Verbal consent given for video visit)        3/14/2024    10:23 AM   Additional Questions   Roomed by Juliette BREWER CMA   Accompanied by Self     Video Start Time:  10:51    History of Present Illness       Reason for visit:  CPAP followupHe consumes 1 sweetened beverage(s) daily.He exercises with enough effort to increase his heart rate 10 to 19 minutes per day.  He exercises with enough effort to increase his heart rate 4 days per week.   He is taking medications regularly.                   Objective           Vitals:  No vitals were obtained today due to virtual visit.    Physical Exam   GENERAL: alert and no distress  EYES: Eyes grossly normal to inspection.  No discharge or erythema, or obvious scleral/conjunctival abnormalities.  RESP: No audible wheeze, cough, or visible cyanosis.    SKIN: Visible skin clear. No significant rash, " abnormal pigmentation or lesions.  NEURO: Cranial nerves grossly intact.  Mentation and speech appropriate for age.  PSYCH: Appropriate affect, tone, and pace of words          Video-Visit Details    Type of service:  Video Visit   Video End Time: 10:58  Originating Location (pt. Location): Home    Distant Location (provider location):  On-site  Platform used for Video Visit: Paco  Signed Electronically by: Shalom Velasco MD

## 2024-05-23 ENCOUNTER — PATIENT OUTREACH (OUTPATIENT)
Dept: CARE COORDINATION | Facility: CLINIC | Age: 69
End: 2024-05-23
Payer: MEDICARE

## 2024-06-06 ENCOUNTER — PATIENT OUTREACH (OUTPATIENT)
Dept: CARE COORDINATION | Facility: CLINIC | Age: 69
End: 2024-06-06
Payer: MEDICARE

## 2024-06-07 DIAGNOSIS — E78.00 HYPERCHOLESTEREMIA: ICD-10-CM

## 2024-06-07 RX ORDER — ATORVASTATIN CALCIUM 20 MG/1
TABLET, FILM COATED ORAL
Qty: 90 TABLET | Refills: 2 | Status: SHIPPED | OUTPATIENT
Start: 2024-06-07

## 2024-06-22 SDOH — HEALTH STABILITY: PHYSICAL HEALTH: ON AVERAGE, HOW MANY DAYS PER WEEK DO YOU ENGAGE IN MODERATE TO STRENUOUS EXERCISE (LIKE A BRISK WALK)?: 4 DAYS

## 2024-06-22 SDOH — HEALTH STABILITY: PHYSICAL HEALTH: ON AVERAGE, HOW MANY MINUTES DO YOU ENGAGE IN EXERCISE AT THIS LEVEL?: 70 MIN

## 2024-06-22 ASSESSMENT — SOCIAL DETERMINANTS OF HEALTH (SDOH): HOW OFTEN DO YOU GET TOGETHER WITH FRIENDS OR RELATIVES?: MORE THAN THREE TIMES A WEEK

## 2024-06-27 ENCOUNTER — OFFICE VISIT (OUTPATIENT)
Dept: FAMILY MEDICINE | Facility: CLINIC | Age: 69
End: 2024-06-27
Attending: FAMILY MEDICINE
Payer: MEDICARE

## 2024-06-27 VITALS
HEIGHT: 72 IN | BODY MASS INDEX: 30.04 KG/M2 | TEMPERATURE: 97.4 F | SYSTOLIC BLOOD PRESSURE: 133 MMHG | RESPIRATION RATE: 20 BRPM | DIASTOLIC BLOOD PRESSURE: 85 MMHG | OXYGEN SATURATION: 98 % | WEIGHT: 221.8 LBS | HEART RATE: 83 BPM

## 2024-06-27 DIAGNOSIS — G47.33 OBSTRUCTIVE SLEEP APNEA SYNDROME: ICD-10-CM

## 2024-06-27 DIAGNOSIS — H61.21 IMPACTED CERUMEN OF RIGHT EAR: ICD-10-CM

## 2024-06-27 DIAGNOSIS — Z00.00 ENCOUNTER FOR MEDICARE ANNUAL WELLNESS EXAM: Primary | ICD-10-CM

## 2024-06-27 DIAGNOSIS — Z12.5 SPECIAL SCREENING FOR MALIGNANT NEOPLASM OF PROSTATE: ICD-10-CM

## 2024-06-27 DIAGNOSIS — E78.00 HYPERCHOLESTEREMIA: ICD-10-CM

## 2024-06-27 DIAGNOSIS — I10 PRIMARY HYPERTENSION: ICD-10-CM

## 2024-06-27 PROBLEM — H93.8X1 PLUGGED FEELING IN EAR, RIGHT: Status: ACTIVE | Noted: 2024-06-01

## 2024-06-27 PROBLEM — M54.32 SCIATICA OF LEFT SIDE WITHOUT BACK PAIN: Status: ACTIVE | Noted: 2024-05-01

## 2024-06-27 LAB
ANION GAP SERPL CALCULATED.3IONS-SCNC: 9 MMOL/L (ref 7–15)
BUN SERPL-MCNC: 13.4 MG/DL (ref 8–23)
CALCIUM SERPL-MCNC: 9.1 MG/DL (ref 8.8–10.2)
CHLORIDE SERPL-SCNC: 103 MMOL/L (ref 98–107)
CHOLEST SERPL-MCNC: 166 MG/DL
CREAT SERPL-MCNC: 1.03 MG/DL (ref 0.67–1.17)
DEPRECATED HCO3 PLAS-SCNC: 26 MMOL/L (ref 22–29)
EGFRCR SERPLBLD CKD-EPI 2021: 79 ML/MIN/1.73M2
FASTING STATUS PATIENT QL REPORTED: ABNORMAL
FASTING STATUS PATIENT QL REPORTED: NORMAL
GLUCOSE SERPL-MCNC: 105 MG/DL (ref 70–99)
HDLC SERPL-MCNC: 70 MG/DL
LDLC SERPL CALC-MCNC: 87 MG/DL
NONHDLC SERPL-MCNC: 96 MG/DL
POTASSIUM SERPL-SCNC: 5.3 MMOL/L (ref 3.4–5.3)
PSA SERPL DL<=0.01 NG/ML-MCNC: 2.17 NG/ML (ref 0–4.5)
SODIUM SERPL-SCNC: 138 MMOL/L (ref 135–145)
TRIGL SERPL-MCNC: 47 MG/DL

## 2024-06-27 PROCEDURE — 80061 LIPID PANEL: CPT | Performed by: FAMILY MEDICINE

## 2024-06-27 PROCEDURE — 36415 COLL VENOUS BLD VENIPUNCTURE: CPT | Performed by: FAMILY MEDICINE

## 2024-06-27 PROCEDURE — 99214 OFFICE O/P EST MOD 30 MIN: CPT | Mod: 25 | Performed by: FAMILY MEDICINE

## 2024-06-27 PROCEDURE — 69210 REMOVE IMPACTED EAR WAX UNI: CPT | Mod: RT | Performed by: FAMILY MEDICINE

## 2024-06-27 PROCEDURE — 80048 BASIC METABOLIC PNL TOTAL CA: CPT | Performed by: FAMILY MEDICINE

## 2024-06-27 PROCEDURE — G0103 PSA SCREENING: HCPCS | Performed by: FAMILY MEDICINE

## 2024-06-27 PROCEDURE — G0439 PPPS, SUBSEQ VISIT: HCPCS | Performed by: FAMILY MEDICINE

## 2024-06-27 NOTE — PROGRESS NOTES
Preventive Care Visit  [facility]  Shalom Velasoc MD, Family Medicine  Jun 27, 2024      Assessment & Plan     Encounter for Medicare annual wellness exam  Patient has been doing well.  We have discussed health maintenance, routine follow-up, preventive services, immunizations  - PRIMARY CARE FOLLOW-UP SCHEDULING    Primary hypertension  Well-controlled, continue current medication, check labs today  - BASIC METABOLIC PANEL; Future  - BASIC METABOLIC PANEL    Hypercholesteremia  Well-controlled,  - Lipid panel reflex to direct LDL Non-fasting; Future  - Lipid panel reflex to direct LDL Non-fasting    Obstructive sleep apnea syndrome  He is still benefiting from use of his CPAP.  He is compliant with use.    Impacted cerumen of right ear  Cerumen easily removed from the right ear with a combination of curette and lavage revealing normal TM    Special screening for malignant neoplasm of prostate  - PSA, screen; Future  - PSA, screen    Patient has been advised of split billing requirements and indicates understanding: Yes        BMI  Estimated body mass index is 30.08 kg/m  as calculated from the following:    Height as of this encounter: 1.829 m (6').    Weight as of this encounter: 100.6 kg (221 lb 12.8 oz).       Counseling  Appropriate preventive services were discussed with this patient, including applicable screening as appropriate for fall prevention, nutrition, physical activity, Tobacco-use cessation, weight loss and cognition.  Checklist reviewing preventive services available has been given to the patient.  Reviewed patient's diet, addressing concerns and/or questions.   The patient reports drinking more than 3 alcoholic drinks per day and/or more than 7 drhnks per week. The patient was counseled and given information about possible harmful effects of excessive alcohol intake.Patient reported safety concerns were addressed today.The patient was provided with written information  regarding signs of hearing loss.       MEDICATIONS:  Continue current medications without change  Work on weight loss  Regular exercise    Subjective   Brendan is a 68 year old, presenting for the following:  Wellness Visit (AWV) and Ear Problem (C/o right ear plugged x 2 weeks)        6/27/2024     9:06 AM   Additional Questions   Roomed by Juliette BREWER CMA   Accompanied by Self         Health Care Directive  Patient does not have a Health Care Directive or Living Will: Patient states has Advance Directive and will bring in a copy to clinic.    Ear Problem    Right ear feels plugged for the last week.  He has sciatica on the left which bothers when he drives.    HTN and HLD are under control and he is compliant with medications            6/22/2024   General Health   How would you rate your overall physical health? Good   Feel stress (tense, anxious, or unable to sleep) Not at all            6/22/2024   Nutrition   Diet: Regular (no restrictions)            6/22/2024   Exercise   Days per week of moderate/strenous exercise 4 days   Average minutes spent exercising at this level 70 min            6/22/2024   Social Factors   Frequency of gathering with friends or relatives More than three times a week   Worry food won't last until get money to buy more No   Food not last or not have enough money for food? No   Do you have housing? (Housing is defined as stable permanent housing and does not include staying ouside in a car, in a tent, in an abandoned building, in an overnight shelter, or couch-surfing.) Yes   Are you worried about losing your housing? No   Lack of transportation? No   Unable to get utilities (heat,electricity)? No            6/22/2024   Fall Risk   Fallen 2 or more times in the past year? No    No   Trouble with walking or balance? No    No       Multiple values from one day are sorted in reverse-chronological order          6/22/2024   Activities of Daily Living- Home Safety   Needs help with the following  daily activites None of the above   Safety concerns in the home Throw rugs in the hallway            6/22/2024   Dental   Dentist two times every year? Yes            6/22/2024   Hearing Screening   Hearing concerns? (!) IT'S HARD TO FOLLOW A CONVERSATION IN A NOISY RESTAURANT OR CROWDED ROOM.    (!) TROUBLE UNDERSTANDING SOFT OR WHISPERED SPEECH.       Multiple values from one day are sorted in reverse-chronological order         6/22/2024   Driving Risk Screening   Patient/family members have concerns about driving No            6/22/2024   General Alertness/Fatigue Screening   Have you been more tired than usual lately? No            6/22/2024   Urinary Incontinence Screening   Bothered by leaking urine in past 6 months No            6/22/2024   TB Screening   Were you born outside of the [place]? No            Today's PHQ-2 Score:       6/27/2024     8:17 AM   PHQ-2 ( 1999 [employer])   Q1: Little interest or pleasure in doing things 0   Q2: Feeling down, depressed or hopeless 0   PHQ-2 Score 0   Q1: Little interest or pleasure in doing things Not at all   Q2: Feeling down, depressed or hopeless Not at all   PHQ-2 Score 0           6/22/2024   Substance Use   Alcohol more than 3/day or more than 7/wk Yes   How often do you have a drink containing alcohol 4 or more times a week   How many alcohol drinks on typical day 1 or 2   How often do you have 5+ drinks at one occasion Less than monthly   Audit 2/3 Score 1   How often not able to stop drinking once started Never   How often failed to do what normally expected Never   How often needed first drink in am after a heavy drinking session Never   How often feeling of guilt or remorse after drinking Never   How often unable to remember what happened the night before Never   Have you or someone else been injured because of your drinking No   Has anyone been concerned or suggested you cut down on drinking No   TOTAL SCORE - AUDIT 5   Do you have a current opioid prescription?  No   How severe/bad is pain from 1 to 10? 0/10 (No Pain)   Do you use any other substances recreationally? (!) ALCOHOL        Social History     Tobacco Use    Smoking status: Former     Current packs/day: 0.00     Types: Dip, chew, snus or snuff, Cigarettes     Start date: 10/10/1977     Quit date: 1997     Years since quittin.0     Passive exposure: Past    Smokeless tobacco: Former     Types: Chew     Quit date: 1997   Vaping Use    Vaping status: Never Used   Substance Use Topics    Alcohol use: Yes    Drug use: Never       Last PSA:   Prostate Specific Antigen Screen   Date Value Ref Range Status   2019 2.7 < OR = 4.0 ng/mL Final     Comment:     The total PSA value from this assay system is   standardized against the WHO standard. The test   result will be approximately 20% lower when compared   to the equimolar-standardized total PSA (Micheal   Eileen). Comparison of serial PSA results should be   interpreted with this fact in mind.     This test was performed using the Siemens   chemiluminescent method. Values obtained from   different assay methods cannot be used  interchangeably. PSA levels, regardless of  value, should not be interpreted as absolute  evidence of the presence or absence of disease.  Lab test performed by:  Lab Mnemonic:[facility]  [facility]  [address] [place] [address]  Ziyad Fernandes M.D.  [facility] Specimen received date and time: 2019 09:04:00.00       ASCVD Risk   The 10-year ASCVD risk score (Ruth KEYS, et al., 2019) is: 16.4%    Values used to calculate the score:      Age: 68 years      Sex: Male      Is Non- : No      Diabetic: No      Tobacco smoker: No      Systolic Blood Pressure: 133 mmHg      Is BP treated: Yes      HDL Cholesterol: 72 mg/dL      Total Cholesterol: 174 mg/dL            Reviewed and updated as needed this visit by Provider                    Past Medical History:   Diagnosis  Date    Hypertension June, 2022     Past Surgical History:   Procedure Laterality Date    C HOME VISIT, SLEEP STUDIES  01/27/2015    AHI: 22.1/hr, heavy snoring 80% of time.  CPAP titrated to +8cm H2O w/ decreased AHI to 0.9/hr and snoring elimination    COLONOSCOPY  03/19/2012    Normal, repeat 10 yrs    HERNIA REPAIR  2018    INGUINAL HERNIA REPAIR Bilateral 01/22/2019    VASECTOMY Bilateral 02/23/1995     Current providers sharing in care for this patient include:  Patient Care Team:  Shalom Velasco MD as PCP - General (Family Medicine)  Shalom Vealsco MD as Assigned PCP    The following health maintenance items are reviewed in Epic and correct as of today:  Health Maintenance   Topic Date Due    HEPATITIS C SCREENING  Never done    RSV VACCINE (Pregnancy & 60+) (1 - 1-dose 60+ series) Never done    ZOSTER IMMUNIZATION (2 of 2) 12/16/2020    COVID-19 Vaccine (4 - 2023-24 season) 09/01/2023    BMP  06/21/2024    LIPID  06/21/2024    ANNUAL REVIEW OF HM ORDERS  06/21/2024    MEDICARE ANNUAL WELLNESS VISIT  06/21/2024    INFLUENZA VACCINE (Season Ended) 09/01/2024    COLORECTAL CANCER SCREENING  06/23/2025    FALL RISK ASSESSMENT  06/27/2025    GLUCOSE  06/21/2026    ADVANCE CARE PLANNING  06/21/2028    DTAP/TDAP/TD IMMUNIZATION (3 - Td or Tdap) 06/22/2033    PHQ-2 (once per calendar year)  Completed    Pneumococcal Vaccine: 65+ Years  Completed    IPV IMMUNIZATION  Aged Out    HPV IMMUNIZATION  Aged Out    MENINGITIS IMMUNIZATION  Aged Out    RSV MONOCLONAL ANTIBODY  Aged Out    LUNG CANCER SCREENING  Discontinued    AORTIC ANEURYSM SCREENING (SYSTEM ASSIGNED)  Discontinued         Review of Systems  Constitutional, neuro, ENT, endocrine, pulmonary, cardiac, gastrointestinal, genitourinary, musculoskeletal, integument and psychiatric systems are negative, except as otherwise noted.     Objective    Exam  /85 (BP Location: Right arm, Patient Position: Sitting, Cuff Size: Adult Large)   Pulse 83    Temp 97.4  F (36.3  C) (Tympanic)   Resp 20   Ht 1.829 m (6')   Wt 100.6 kg (221 lb 12.8 oz)   SpO2 98%   BMI 30.08 kg/m     Estimated body mass index is 30.08 kg/m  as calculated from the following:    Height as of this encounter: 1.829 m (6').    Weight as of this encounter: 100.6 kg (221 lb 12.8 oz).    Physical Exam  GENERAL: alert and no distress  EYES: Eyes grossly normal to inspection, PERRL and conjunctivae and sclerae normal  HENT: normal cephalic/atraumatic, right ear: occluded with wax, left ear: normal: no effusions, no erythema, normal landmarks, nose and mouth without ulcers or lesions, oropharynx clear, and oral mucous membranes moist  NECK: no adenopathy, no asymmetry, masses, or scars  RESP: lungs clear to auscultation - no rales, rhonchi or wheezes  CV: regular rate and rhythm, normal S1 S2, no S3 or S4, no murmur, click or rub, no peripheral edema  ABDOMEN: soft, nontender, no hepatosplenomegaly, no masses and bowel sounds normal  MS: no gross musculoskeletal defects noted, no edema  SKIN: no suspicious lesions or rashes  PSYCH: mentation appears normal, affect normal/bright        6/27/2024   Mini Cog   Clock Draw Score 2 Normal   3 Item Recall 3 objects recalled   Mini Cog Total Score 5                 Signed Electronically by: Shalom Velasco MD

## 2024-12-12 DIAGNOSIS — I10 PRIMARY HYPERTENSION: ICD-10-CM

## 2024-12-12 RX ORDER — LOSARTAN POTASSIUM 50 MG/1
50 TABLET ORAL DAILY
Qty: 90 TABLET | Refills: 1 | Status: SHIPPED | OUTPATIENT
Start: 2024-12-12

## 2025-03-08 DIAGNOSIS — E78.00 HYPERCHOLESTEREMIA: ICD-10-CM

## 2025-03-10 RX ORDER — ATORVASTATIN CALCIUM 20 MG/1
TABLET, FILM COATED ORAL
Qty: 90 TABLET | Refills: 0 | Status: SHIPPED | OUTPATIENT
Start: 2025-03-10

## 2025-05-02 ENCOUNTER — DOCUMENTATION ONLY (OUTPATIENT)
Dept: FAMILY MEDICINE | Facility: CLINIC | Age: 70
End: 2025-05-02
Payer: MEDICARE

## 2025-05-02 DIAGNOSIS — G47.33 OBSTRUCTIVE SLEEP APNEA SYNDROME: Primary | ICD-10-CM

## 2025-05-28 ENCOUNTER — PATIENT OUTREACH (OUTPATIENT)
Dept: CARE COORDINATION | Facility: CLINIC | Age: 70
End: 2025-05-28
Payer: MEDICARE

## 2025-06-05 DIAGNOSIS — E78.00 HYPERCHOLESTEREMIA: ICD-10-CM

## 2025-06-05 RX ORDER — ATORVASTATIN CALCIUM 20 MG/1
20 TABLET, FILM COATED ORAL
Qty: 90 TABLET | Refills: 0 | Status: SHIPPED | OUTPATIENT
Start: 2025-06-05

## 2025-06-22 SDOH — HEALTH STABILITY: PHYSICAL HEALTH: ON AVERAGE, HOW MANY MINUTES DO YOU ENGAGE IN EXERCISE AT THIS LEVEL?: 70 MIN

## 2025-06-22 SDOH — HEALTH STABILITY: PHYSICAL HEALTH: ON AVERAGE, HOW MANY DAYS PER WEEK DO YOU ENGAGE IN MODERATE TO STRENUOUS EXERCISE (LIKE A BRISK WALK)?: 4 DAYS

## 2025-06-22 ASSESSMENT — SOCIAL DETERMINANTS OF HEALTH (SDOH): HOW OFTEN DO YOU GET TOGETHER WITH FRIENDS OR RELATIVES?: MORE THAN THREE TIMES A WEEK

## 2025-06-25 ENCOUNTER — ORDERS ONLY (AUTO-RELEASED) (OUTPATIENT)
Dept: FAMILY MEDICINE | Facility: CLINIC | Age: 70
End: 2025-06-25

## 2025-06-25 ENCOUNTER — OFFICE VISIT (OUTPATIENT)
Dept: FAMILY MEDICINE | Facility: CLINIC | Age: 70
End: 2025-06-25
Payer: MEDICARE

## 2025-06-25 ENCOUNTER — RESULTS FOLLOW-UP (OUTPATIENT)
Dept: FAMILY MEDICINE | Facility: CLINIC | Age: 70
End: 2025-06-25

## 2025-06-25 VITALS
BODY MASS INDEX: 30.18 KG/M2 | OXYGEN SATURATION: 98 % | HEIGHT: 72 IN | HEART RATE: 115 BPM | TEMPERATURE: 97.2 F | RESPIRATION RATE: 20 BRPM | WEIGHT: 222.8 LBS | DIASTOLIC BLOOD PRESSURE: 80 MMHG | SYSTOLIC BLOOD PRESSURE: 142 MMHG

## 2025-06-25 DIAGNOSIS — Z13.6 SCREENING FOR CARDIOVASCULAR CONDITION: ICD-10-CM

## 2025-06-25 DIAGNOSIS — Z12.11 SCREEN FOR COLON CANCER: ICD-10-CM

## 2025-06-25 DIAGNOSIS — G47.33 OBSTRUCTIVE SLEEP APNEA (ADULT) (PEDIATRIC): ICD-10-CM

## 2025-06-25 DIAGNOSIS — H91.93 DECREASED HEARING OF BOTH EARS: ICD-10-CM

## 2025-06-25 DIAGNOSIS — E78.00 HYPERCHOLESTEREMIA: ICD-10-CM

## 2025-06-25 DIAGNOSIS — I10 PRIMARY HYPERTENSION: ICD-10-CM

## 2025-06-25 DIAGNOSIS — Z00.00 ENCOUNTER FOR MEDICARE ANNUAL WELLNESS EXAM: Primary | ICD-10-CM

## 2025-06-25 LAB
ANION GAP SERPL CALCULATED.3IONS-SCNC: 11 MMOL/L (ref 7–15)
BUN SERPL-MCNC: 14.5 MG/DL (ref 8–23)
CALCIUM SERPL-MCNC: 9.2 MG/DL (ref 8.8–10.4)
CHLORIDE SERPL-SCNC: 103 MMOL/L (ref 98–107)
CHOLEST SERPL-MCNC: 162 MG/DL
CREAT SERPL-MCNC: 1 MG/DL (ref 0.67–1.17)
EGFRCR SERPLBLD CKD-EPI 2021: 81 ML/MIN/1.73M2
FASTING STATUS PATIENT QL REPORTED: YES
FASTING STATUS PATIENT QL REPORTED: YES
GLUCOSE SERPL-MCNC: 95 MG/DL (ref 70–99)
HCO3 SERPL-SCNC: 23 MMOL/L (ref 22–29)
HDLC SERPL-MCNC: 62 MG/DL
LDLC SERPL CALC-MCNC: 87 MG/DL
NONHDLC SERPL-MCNC: 100 MG/DL
POTASSIUM SERPL-SCNC: 4.9 MMOL/L (ref 3.4–5.3)
SODIUM SERPL-SCNC: 137 MMOL/L (ref 135–145)
TRIGL SERPL-MCNC: 67 MG/DL

## 2025-06-25 PROCEDURE — 36415 COLL VENOUS BLD VENIPUNCTURE: CPT | Performed by: FAMILY MEDICINE

## 2025-06-25 PROCEDURE — G0439 PPPS, SUBSEQ VISIT: HCPCS | Performed by: FAMILY MEDICINE

## 2025-06-25 PROCEDURE — 3077F SYST BP >= 140 MM HG: CPT | Performed by: FAMILY MEDICINE

## 2025-06-25 PROCEDURE — 80048 BASIC METABOLIC PNL TOTAL CA: CPT | Performed by: FAMILY MEDICINE

## 2025-06-25 PROCEDURE — 99214 OFFICE O/P EST MOD 30 MIN: CPT | Mod: 25 | Performed by: FAMILY MEDICINE

## 2025-06-25 PROCEDURE — 3079F DIAST BP 80-89 MM HG: CPT | Performed by: FAMILY MEDICINE

## 2025-06-25 PROCEDURE — 82465 ASSAY BLD/SERUM CHOLESTEROL: CPT | Performed by: FAMILY MEDICINE

## 2025-06-25 RX ORDER — ATORVASTATIN CALCIUM 20 MG/1
20 TABLET, FILM COATED ORAL
Qty: 90 TABLET | Refills: 2 | Status: SHIPPED | OUTPATIENT
Start: 2025-06-25

## 2025-06-25 RX ORDER — LOSARTAN POTASSIUM 50 MG/1
50 TABLET ORAL DAILY
Qty: 90 TABLET | Refills: 3 | Status: SHIPPED | OUTPATIENT
Start: 2025-06-25

## 2025-06-25 NOTE — PROGRESS NOTES
Preventive Care Visit  [facility]  Shalom Velasco MD, Family Medicine  Jun 25, 2025      Assessment & Plan     Encounter for Medicare annual wellness exam  We have discussed health maintenance, routine follow-up, immunizations, heart healthy diet, regular activity, weight maintenance.     Primary hypertension  Fair control, he will continue current medication, encouraged blood pressure measurements at home.  I have asked him to send us some numbers in the next few weeks.  If not not at goal add hydrochlorothiazide  - BASIC METABOLIC PANEL; Future  - losartan (COZAAR) 50 MG tablet; Take 1 tablet (50 mg) by mouth daily.    Hypercholesteremia  Progressing expected, continue current medication  - Lipid panel reflex to direct LDL Non-fasting; Future  - atorvastatin (LIPITOR) 20 MG tablet; Take 1 tablet (20 mg) by mouth daily at 2 pm.    Screen for colon cancer  Options discussed, Saqib ordered  - COLOGUAASHA([employer]); Future    Screening for cardiovascular condition      Obstructive sleep apnea (adult) (pediatric)  Compliant with use of his CPAP.  He continues to benefit.    Decreased hearing of both ears  We have discussed audiology referral.  He declines.    Patient has been advised of split billing requirements and indicates understanding: Yes        BMI  Estimated body mass index is 30.22 kg/m  as calculated from the following:    Height as of this encounter: 1.829 m (6').    Weight as of this encounter: 101.1 kg (222 lb 12.8 oz).     Reviewed preventive health counseling, as reflected in patient instructions       Regular exercise       Healthy diet/nutrition       Hearing screening       Colorectal cancer screening  Counseling  Appropriate preventive services were addressed with this patient via screening, questionnaire, or discussion as appropriate for fall prevention, nutrition, physical activity, Tobacco-use cessation, social engagement, weight loss and cognition.   Checklist reviewing preventive services available has been given to the patient.  Reviewed patient's diet, addressing concerns and/or questions.   The patient reports drinking more than 3 alcoholic drinks per day and/or more than 7 drhnks per week. The patient was counseled and given information about possible harmful effects of excessive alcohol intake.The patient was provided with written information regarding signs of hearing loss.             Ebony Cardona is a 69 year old, presenting for the following:  Wellness Visit (AWV)        6/25/2025     7:42 AM   Additional Questions   Roomed by Juliette BREWER CMA   Accompanied by self          HPI   Patient is here for health maintenance and chronic disease follow up     HTN      Current medications:  losartan  Medication side effects:  no   Medication concerns:  no   Talking medications as prescribed:  yes  Blood pressure controlled:  yes  Home monitor numbers:  rarely checks   End organ symptoms:  no    HLD     Medication:  atorvastatin  LDL:  at goal  Myalgia:  no       Advance Care Planning    Patient states has Health Care Directive and will send to [employer].        6/22/2025   General Health   How would you rate your overall physical health? Good   Feel stress (tense, anxious, or unable to sleep) Only a little   (!) STRESS CONCERN      6/22/2025   Nutrition   Diet: Regular (no restrictions)         6/22/2025   Exercise   Days per week of moderate/strenous exercise 4 days   Average minutes spent exercising at this level 70 min         6/22/2025   Social Factors   Frequency of gathering with friends or relatives More than three times a week   Worry food won't last until get money to buy more No   Food not last or not have enough money for food? No   Do you have housing? (Housing is defined as stable permanent housing and does not include staying outside in a car, in a tent, in an abandoned building, in an overnight shelter, or couch-surfing.) Yes   Are you  worried about losing your housing? No   Lack of transportation? No   Unable to get utilities (heat,electricity)? No         6/22/2025   Fall Risk   Fallen 2 or more times in the past year? No   Trouble with walking or balance? No          6/22/2025   Activities of Daily Living- Home Safety   Needs help with the following daily activites None of the above   Safety concerns in the home None of the above         6/22/2025   Dental   Dentist two times every year? Yes         6/22/2025   Hearing Screening   Hearing concerns? (!) IT'S HARD TO FOLLOW A CONVERSATION IN A NOISY [employer]AUR[employer] OR CROWDED ROOM.   Would you like a referral for hearing testing? No         6/22/2025   Driving Risk Screening   Patient/family members have concerns about driving No         6/22/2025   General Alertness/Fatigue Screening   Have you been more tired than usual lately? No         6/22/2025   Urinary Incontinence Screening   Bothered by leaking urine in past 6 months No         Today's PHQ-2 Score:       6/25/2025     7:39 AM   PHQ-2 ( 1999 Pf[employer])   Q1: Little interest or pleasure in doing things 0   Q2: Feeling down, depressed or hopeless 0   PHQ-2 Score 0    Q1: Little interest or pleasure in doing things Not at all   Q2: Feeling down, depressed or hopeless Not at all   PHQ-2 Score 0       Patient-reported           6/22/2025   Substance Use   Alcohol more than 3/day or more than 7/wk Yes   How often do you have a drink containing alcohol 4 or more times a week   How many alcohol drinks on typical day 1 or 2   How often do you have 5+ drinks at one occasion Less than monthly   Audit 2/3 Score 1   How often not able to stop drinking once started Never   How often failed to do what normally expected Never   How often needed first drink in am after a heavy drinking session Never   How often feeling of guilt or remorse after drinking Never   How often unable to remember what happened the night before Never   Have you or someone else been  injured because of your drinking No   Has anyone been concerned or suggested you cut down on drinking No   TOTAL SCORE - AUDIT 5   Do you have a current opioid prescription? No   How severe/bad is pain from 1 to 10? 0/10 (No Pain)   Do you use any other substances recreationally? No     Social History     Tobacco Use    Smoking status: Former     Current packs/day: 0.00     Types: Dip, chew, snus or snuff, Cigarettes     Start date: 10/10/1977     Quit date: 1997     Years since quittin.0     Passive exposure: Past    Smokeless tobacco: Former     Types: Chew     Quit date: 1997   Vaping Use    Vaping status: Never Used   Substance Use Topics    Alcohol use: Yes    Drug use: Never       ASCVD Risk   The 10-year ASCVD risk score (Ruth KEYS, et al., 2019) is: 23.4%    Values used to calculate the score:      Age: 69 years      Sex: Male      Is Non- : No      Diabetic: No      Tobacco smoker: No      Systolic Blood Pressure: 159 mmHg      Is BP treated: Yes      HDL Cholesterol: 70 mg/dL      Total Cholesterol: 166 mg/dL            Reviewed and updated as needed this visit by Provider                    Past Medical History:   Diagnosis Date    Hypertension      Past Surgical History:   Procedure Laterality Date    C HOME VISIT, SLEEP STUDIES  2015    AHI: 22.1/hr, heavy snoring 80% of time.  CPAP titrated to +8cm H2O w/ decreased AHI to 0.9/hr and snoring elimination    COLONOSCOPY  2012    Normal, repeat 10 yrs    HERNIA REPAIR  2018    INGUINAL HERNIA REPAIR Bilateral 2019    VASECTOMY Bilateral 1995     Lab work is in process  Labs reviewed in EPIC  BP Readings from Last 3 Encounters:   25 (!) 142/80   24 133/85   24 137/82    Wt Readings from Last 3 Encounters:   25 101.1 kg (222 lb 12.8 oz)   24 100.6 kg (221 lb 12.8 oz)   24 103.2 kg (227 lb 9.6 oz)                  Current providers sharing in  care for this patient include:  Patient Care Team:  Shalom Velasco MD as PCP - General (Family Medicine)  Shalom Velasco MD as Assigned PCP    The following health maintenance items are reviewed in Epic and correct as of today:  Health Maintenance   Topic Date Due    HEPATITIS C SCREENING  Never done    ZOSTER VACCINE (2 of 2) 12/16/2020    COVID-19 VACCINE (4 - 2024-25 season) 09/01/2024    COLORECTAL CANCER SCREENING  06/23/2025    MEDICARE ANNUAL WELLNESS VISIT  06/27/2025    BMP  06/27/2025    LIPID  06/27/2025    ANNUAL REVIEW OF HM ORDERS  06/27/2025    INFLUENZA VACCINE (Season Ended) 09/01/2025    FALL RISK ASSESSMENT  06/25/2026    DIABETES SCREENING  06/27/2027    ADVANCE CARE PLANNING  06/27/2029    RSV VACCINE (1 - 1-dose 75+ series) 12/20/2029    DTAP/TDAP/TD VACCINE (3 - Td or Tdap) 06/22/2033    PHQ-2 (once per calendar year)  Completed    PNEUMOCOCCAL VACCINE 50+ YEARS  Completed    HPV VACCINE  Aged Out    MENINGITIS VACCINE  Aged Out    AORTIC ANEURYSM SCREENING (SYSTEM ASSIGNED)  Discontinued         Review of Systems  Constitutional, neuro, ENT, endocrine, pulmonary, cardiac, gastrointestinal, genitourinary, musculoskeletal, integument and psychiatric systems are negative, except as otherwise noted.     Objective    Exam  BP (!) 159/93 (BP Location: Right arm, Patient Position: Sitting, Cuff Size: Adult Large)   Pulse 115   Temp 97.2  F (36.2  C) (Tympanic)   Resp 20   Ht 1.829 m (6')   Wt 101.1 kg (222 lb 12.8 oz)   SpO2 98%   BMI 30.22 kg/m     Estimated body mass index is 30.22 kg/m  as calculated from the following:    Height as of this encounter: 1.829 m (6').    Weight as of this encounter: 101.1 kg (222 lb 12.8 oz).    Physical Exam  GENERAL: alert and no distress  EYES: Eyes grossly normal to inspection, PERRL and conjunctivae and sclerae normal  HENT: ear canals and TM's normal, nose and mouth without ulcers or lesions  NECK: no adenopathy, no asymmetry, masses, or  scars  RESP: lungs clear to auscultation - no rales, rhonchi or wheezes  CV: regular rate and rhythm, normal S1 S2, no S3 or S4, no murmur, click or rub, no peripheral edema  ABDOMEN: soft, nontender, no hepatosplenomegaly, no masses and bowel sounds normal  MS: no gross musculoskeletal defects noted, no edema  SKIN: no suspicious lesions or rashes  NEURO: Normal strength and tone, mentation intact and speech normal  PSYCH: mentation appears normal, affect normal/bright        6/25/2025   Mini Cog   Clock Draw Score 2 Normal   3 Item Recall 3 objects recalled   Mini Cog Total Score 5              Signed Electronically by: Shalom Velasco MD

## 2025-06-25 NOTE — PATIENT INSTRUCTIONS
Patient Education   Preventive Care Advice   This is general advice given by our system to help you stay healthy. However, your care team may have specific advice just for you. Please talk to your care team about your preventive care needs.  Nutrition  Eat 5 or more servings of fruits and vegetables each day.  Try wheat bread, brown rice and whole grain pasta (instead of white bread, rice, and pasta).  Get enough calcium and vitamin D. Check the label on foods and aim for 100% of the RDA (recommended daily allowance).  Lifestyle  Exercise at least 150 minutes each week  (30 minutes a day, 5 days a week).  Do muscle strengthening activities 2 days a week. These help control your weight and prevent disease.  No smoking.  Wear sunscreen to prevent skin cancer.  Have a dental exam and cleaning every 6 months.  Yearly exams  See your health care team every year to talk about:  Any changes in your health.  Any medicines your care team has prescribed.  Preventive care, family planning, and ways to prevent chronic diseases.  Shots (vaccines)   HPV shots (up to age 26), if you've never had them before.  Hepatitis B shots (up to age 59), if you've never had them before.  COVID-19 shot: Get this shot when it's due.  Flu shot: Get a flu shot every year.  Tetanus shot: Get a tetanus shot every 10 years.  Pneumococcal, hepatitis A, and RSV shots: Ask your care team if you need these based on your risk.  Shingles shot (for age 50 and up)  General health tests  Diabetes screening:  Starting at age 35, Get screened for diabetes at least every 3 years.  If you are younger than age 35, ask your care team if you should be screened for diabetes.  Cholesterol test: At age 39, start having a cholesterol test every 5 years, or more often if advised.  Bone density scan (DEXA): At age 50, ask your care team if you should have this scan for osteoporosis (brittle bones).  Hepatitis C: Get tested at least once in your life.  STIs (sexually  transmitted infections)  Before age 24: Ask your care team if you should be screened for STIs.  After age 24: Get screened for STIs if you're at risk. You are at risk for STIs (including HIV) if:  You are sexually active with more than one person.  You don't use condoms every time.  You or a partner was diagnosed with a sexually transmitted infection.  If you are at risk for HIV, ask about PrEP medicine to prevent HIV.  Get tested for HIV at least once in your life, whether you are at risk for HIV or not.  Cancer screening tests  Cervical cancer screening: If you have a cervix, begin getting regular cervical cancer screening tests starting at age 21.  Breast cancer scan (mammogram): If you've ever had breasts, begin having regular mammograms starting at age 40. This is a scan to check for breast cancer.  Colon cancer screening: It is important to start screening for colon cancer at age 45.  Have a colonoscopy test every 10 years (or more often if you're at risk) Or, ask your provider about stool tests like a FIT test every year or Cologuard test every 3 years.  To learn more about your testing options, visit:   .  For help making a decision, visit:   https://bit.ly/qo79408.  Prostate cancer screening test: If you have a prostate, ask your care team if a prostate cancer screening test (PSA) at age 55 is right for you.  Lung cancer screening: If you are a current or former smoker ages 50 to 80, ask your care team if ongoing lung cancer screenings are right for you.  For informational purposes only. Not to replace the advice of your health care provider. Copyright   2023 [facility]. All rights reserved. Clinically reviewed by the [facility]. [employer] [id number] - REV 01/24.  Eating Healthy Foods: Care Instructions  With every meal, you can make healthy food choices. Try to eat a variety of fruits, vegetables, whole grains, lean proteins, and low-fat dairy products. This can help  "you get the right balance of nutrients, including vitamins and minerals. Small changes add up over time. You can start by adding one healthy food to your meals each day.    Try to make half your plate fruits and vegetables, one-fourth whole grains, and one-fourth lean proteins. Try including dairy with your meals.   Eat more fruits and vegetables. Try to have them with most meals and snacks.   Foods for healthy eating        Fruits   These can be fresh, frozen, canned, or dried.  Try to choose whole fruit rather than fruit juice.  Eat a variety of colors.        Vegetables   These can be fresh, frozen, canned, or dried.  Beans, peas, and lentils count too.        Whole grains   Choose whole-grain breads, cereals, and noodles.  Try brown rice.        Lean proteins   These can include lean meat, poultry, fish, and eggs.  You can also have tofu, beans, peas, lentils, nuts, and seeds.        Dairy   Try milk, yogurt, and cheese.  Choose low-fat or fat-free when you can.  If you need to, use lactose-free milk or fortified plant-based milk products, such as soy milk.        Water   Drink water when you're thirsty.  Limit sugar-sweetened drinks, including soda, fruit drinks, and sports drinks.  Where can you learn more?  Go to https://www.[employer].net/patiented  Enter T756 in the search box to learn more about \"Eating Healthy Foods: Care Instructions.\"  Current as of: October 7, 2024  Content Version: 14.5 2024-2025 [employer].   Care instructions adapted under license by your healthcare professional. If you have questions about a medical condition or this instruction, always ask your healthcare professional. [employer] disclaims any warranty or liability for your use of this information.    Hearing Loss: Care Instructions  Overview     Hearing loss is a sudden or slow decrease in how well you hear. It can range from slight to profound. Permanent hearing loss can occur with aging. It also can " happen when you are exposed long-term to loud noise. Examples include listening to loud music, riding motorcycles, or being around other loud machines.  Hearing loss can affect your work and home life. It can make you feel lonely or depressed. You may feel that you have lost your independence. But hearing aids and other devices can help you hear better and feel connected to others.  Follow-up care is a key part of your treatment and safety. Be sure to make and go to all appointments, and call your doctor if you are having problems. It's also a good idea to know your test results and keep a list of the medicines you take.  How can you care for yourself at home?  Avoid loud noises whenever possible. This helps keep your hearing from getting worse.  Always wear hearing protection around loud noises.  Wear a hearing aid as directed.  A professional can help you pick a hearing aid that will work best for you.  You can also get hearing aids over the counter for mild to moderate hearing loss.  Have hearing tests as your doctor suggests. They can show whether your hearing has changed. Your hearing aid may need to be adjusted.  Use other devices as needed. These may include:  Telephone amplifiers and hearing aids that can connect to a television, stereo, radio, or microphone.  Devices that use lights or vibrations. These alert you to the doorbell, a ringing telephone, or a baby monitor.  Television closed-captioning. This shows the words at the bottom of the screen. Most new TVs can do this.  TTY (text telephone). This lets you type messages back and forth on the telephone instead of talking or listening. These devices are also called TDD. When messages are typed on the keyboard, they are sent over the phone line to a receiving TTY. The message is shown on a monitor.  Use text messaging, social media, and email if it is hard for you to communicate by telephone.  Try to learn a listening technique called speechreading. It is  "not lipreading. You pay attention to people's gestures, expressions, posture, and tone of voice. These clues can help you understand what a person is saying. Face the person you are talking to, and have them face you. Make sure the lighting is good. You need to see the other person's face clearly.  Think about counseling if you need help to adjust to your hearing loss.  When should you call for help?  Watch closely for changes in your health, and be sure to contact your doctor if:    You think your hearing is getting worse.     You have new symptoms, such as dizziness or nausea.   Where can you learn more?  Go to https://www.[employer].net/patiented  Enter R798 in the search box to learn more about \"Hearing Loss: Care Instructions.\"  Current as of: October 27, 2024  Content Version: 14.5    3724-4094 [employer].   Care instructions adapted under license by your healthcare professional. If you have questions about a medical condition or this instruction, always ask your healthcare professional. [employer] disclaims any warranty or liability for your use of this information.    9 Ways to Cut Back on Drinking  Maybe you've found yourself drinking more alcohol than you'd prefer. If you want to cut back, here are some ideas to try.    Think before you drink.  Do you really want a drink, or is it just a habit? If you're used to having a drink at a certain time, try doing something else then.     Look for substitutes.  Find some no-alcohol drinks that you enjoy, like flavored seltzer water, tea with honey, or tonic with a slice of lime. Or try alcohol-free beer or \"virgin\" cocktails (without the alcohol).     Drink more water.  Use water to quench your thirst. Drink a glass of water before you have any alcohol. Have another glass along with every drink or between drinks.     Shrink your drink.  For example, have a bottle of beer instead of a pint. Use a smaller glass for wine. Choose drinks with " "lower alcohol content (ABV%). Or use less liquor and more mixer in cocktails.     Slow down.  It's easy to drink quickly and without thinking about it. Pay attention, and make each drink last longer.     Do the math.  Total up how much you spend on alcohol each month. How much is that a year? If you cut back, what could you do with the money you save?     Take a break.  Choose a day or two each week when you won't drink at all. Notice how you feel on those days, physically and emotionally. How did you sleep? Do you feel better? Over time, add more break days.     Count calories.  Would you like to lose some weight? For some people that's a good motivator for cutting back. Figure out how many calories are in each drink. How many does that add up to in a day? In a week? In a month?     Practice saying no.  Be ready when someone offers you a drink. Try: \"Thanks, I've had enough.\" Or \"Thanks, but I'm cutting back.\" Or \"No, thanks. I feel better when I drink less.\"   Current as of: August 20, 2024  Content Version: 14.5    9664-0579 [employer].   Care instructions adapted under license by your healthcare professional. If you have questions about a medical condition or this instruction, always ask your healthcare professional. [employer] disclaims any warranty or liability for your use of this information.  Learning About Being Active as an Older Adult  Why is being active important as you get older?     Being active is one of the best things you can do for your health. And it's never too late to start. Being active--or getting active, if you aren't already--has definite benefits. It can:  Give you more energy,  Keep your mind sharp.  Improve balance to reduce your risk of falls.  Help you manage chronic illness with fewer medicines.  No matter how old you are, how fit you are, or what health problems you have, there is a form of activity that will work for you. And the more physical activity you " can do, the better your overall health will be.  What kinds of activity can help you stay healthy?  Being more active will make your daily activities easier. Physical activity includes planned exercise and things you do in daily life. There are four types of activity:  Aerobic.  Doing aerobic activity makes your heart and lungs strong.  Includes walking, dancing, and gardening.  Aim for at least 2  hours spread throughout the week.  It improves your energy and can help you sleep better.  Muscle-strengthening.  This type of activity can help maintain muscle and strengthen bones.  Includes climbing stairs, using resistance bands, and lifting or carrying heavy loads.  Aim for at least twice a week.  It can help protect the knees and other joints.  Stretching.  Stretching gives you better range of motion in joints and muscles.  Includes upper arm stretches, calf stretches, and gentle yoga.  Aim for at least twice a week, preferably after your muscles are warmed up from other activities.  It can help you function better in daily life.  Balancing.  This helps you stay coordinated and have good posture.  Includes heel-to-toe walking, bebeto chi, and certain types of yoga.  Aim for at least 3 days a week.  It can reduce your risk of falling.  Even if you have a hard time meeting the recommendations, it's better to be more active than less active. All activity done in each category counts toward your weekly total. You'd be surprised how daily things like carrying groceries, keeping up with grandchildren, and taking the stairs can add up.  What keeps you from being active?  If you've had a hard time being more active, you're not alone. Maybe you remember being able to do more. Or maybe you've never thought of yourself as being active. It's frustrating when you can't do the things you want. Being more active can help. What's holding you back?  Getting started.  Have a goal, but break it into easy tasks. Small steps build into big  "accomplishments.  Staying motivated.  If you feel like skipping your activity, remember your goal. Maybe you want to move better and stay independent. Every activity gets you one step closer.  Not feeling your best.  Start with 5 minutes of an activity you enjoy. Prove to yourself you can do it. As you get comfortable, increase your time.  You may not be where you want to be. But you're in the process of getting there. Everyone starts somewhere.  How can you find safe ways to stay active?  Talk with your doctor about any physical challenges you're facing. Make a plan with your doctor if you have a health problem or aren't sure how to get started with activity.  If you're already active, ask your doctor if there is anything you should change to stay safe as your body and health change.  If you tend to feel dizzy after you take medicine, avoid activity at that time. Try being active before you take your medicine. This will reduce your risk of falls.  If you plan to be active at home, make sure to clear your space before you get started. Remove things like TV cords, coffee tables, and throw rugs. It's safest to have plenty of space to move freely.  The key to getting more active is to take it slow and steady. Try to improve only a little bit at a time. Pick just one area to improve on at first. And if an activity hurts, stop and talk to your doctor.  Where can you learn more?  Go to https://www.[employer].net/patiented  Enter P600 in the search box to learn more about \"Learning About Being Active as an Older Adult.\"  Current as of: July 31, 2024  Content Version: 14.5    2253-7845 [employer].   Care instructions adapted under license by your healthcare professional. If you have questions about a medical condition or this instruction, always ask your healthcare professional. [employer] disclaims any warranty or liability for your use of this information.       "

## 2025-07-09 LAB — NONINV COLON CA DNA+OCC BLD SCRN STL QL: NEGATIVE
